# Patient Record
Sex: MALE | Race: WHITE | NOT HISPANIC OR LATINO | Employment: OTHER | URBAN - METROPOLITAN AREA
[De-identification: names, ages, dates, MRNs, and addresses within clinical notes are randomized per-mention and may not be internally consistent; named-entity substitution may affect disease eponyms.]

---

## 2017-01-26 DIAGNOSIS — C61 MALIGNANT NEOPLASM OF PROSTATE (HCC): ICD-10-CM

## 2017-02-02 ENCOUNTER — LAB CONVERSION - ENCOUNTER (OUTPATIENT)
Dept: OTHER | Facility: OTHER | Age: 72
End: 2017-02-02

## 2017-02-02 LAB — PROSTATE SPECIFIC ANTIGEN TOTAL (HISTORICAL): 1.3 NG/ML

## 2017-02-23 ENCOUNTER — GENERIC CONVERSION - ENCOUNTER (OUTPATIENT)
Dept: OTHER | Facility: OTHER | Age: 72
End: 2017-02-23

## 2017-02-23 ENCOUNTER — APPOINTMENT (OUTPATIENT)
Dept: RADIATION ONCOLOGY | Facility: CLINIC | Age: 72
End: 2017-02-23
Attending: RADIOLOGY
Payer: COMMERCIAL

## 2017-02-23 PROCEDURE — G0463 HOSPITAL OUTPT CLINIC VISIT: HCPCS | Performed by: RADIOLOGY

## 2017-02-23 PROCEDURE — 99213 OFFICE O/P EST LOW 20 MIN: CPT | Performed by: RADIOLOGY

## 2017-08-01 DIAGNOSIS — C61 MALIGNANT NEOPLASM OF PROSTATE (HCC): ICD-10-CM

## 2017-08-31 ENCOUNTER — LAB CONVERSION - ENCOUNTER (OUTPATIENT)
Dept: OTHER | Facility: OTHER | Age: 72
End: 2017-08-31

## 2017-08-31 LAB — PROSTATE SPECIFIC ANTIGEN TOTAL (HISTORICAL): 1.3 NG/ML

## 2017-09-28 ENCOUNTER — APPOINTMENT (OUTPATIENT)
Dept: RADIATION ONCOLOGY | Facility: CLINIC | Age: 72
End: 2017-09-28
Attending: RADIOLOGY
Payer: COMMERCIAL

## 2017-09-28 ENCOUNTER — GENERIC CONVERSION - ENCOUNTER (OUTPATIENT)
Dept: OTHER | Facility: OTHER | Age: 72
End: 2017-09-28

## 2017-09-28 PROCEDURE — 99214 OFFICE O/P EST MOD 30 MIN: CPT | Performed by: RADIOLOGY

## 2017-09-28 PROCEDURE — G0463 HOSPITAL OUTPT CLINIC VISIT: HCPCS | Performed by: RADIOLOGY

## 2018-01-12 VITALS
BODY MASS INDEX: 31.4 KG/M2 | DIASTOLIC BLOOD PRESSURE: 68 MMHG | RESPIRATION RATE: 16 BRPM | HEIGHT: 69 IN | OXYGEN SATURATION: 90 % | SYSTOLIC BLOOD PRESSURE: 114 MMHG | HEART RATE: 64 BPM | WEIGHT: 212 LBS

## 2018-01-13 VITALS
OXYGEN SATURATION: 96 % | BODY MASS INDEX: 32.56 KG/M2 | WEIGHT: 219.8 LBS | RESPIRATION RATE: 16 BRPM | HEIGHT: 69 IN | SYSTOLIC BLOOD PRESSURE: 116 MMHG | HEART RATE: 75 BPM | DIASTOLIC BLOOD PRESSURE: 80 MMHG

## 2018-07-05 ENCOUNTER — TELEPHONE (OUTPATIENT)
Dept: RADIATION ONCOLOGY | Facility: CLINIC | Age: 73
End: 2018-07-05

## 2018-09-01 DIAGNOSIS — C61 MALIGNANT NEOPLASM OF PROSTATE (HCC): ICD-10-CM

## 2018-09-08 LAB — PSA SERPL-MCNC: 1.8 NG/ML

## 2018-09-20 ENCOUNTER — RADIATION ONCOLOGY FOLLOW-UP (OUTPATIENT)
Dept: RADIATION ONCOLOGY | Facility: CLINIC | Age: 73
End: 2018-09-20
Attending: RADIOLOGY
Payer: COMMERCIAL

## 2018-09-20 ENCOUNTER — APPOINTMENT (OUTPATIENT)
Dept: RADIATION ONCOLOGY | Facility: CLINIC | Age: 73
End: 2018-09-20
Payer: COMMERCIAL

## 2018-09-20 VITALS
RESPIRATION RATE: 14 BRPM | HEART RATE: 62 BPM | HEIGHT: 69 IN | DIASTOLIC BLOOD PRESSURE: 70 MMHG | OXYGEN SATURATION: 97 % | WEIGHT: 219 LBS | BODY MASS INDEX: 32.44 KG/M2 | TEMPERATURE: 97.6 F | SYSTOLIC BLOOD PRESSURE: 116 MMHG

## 2018-09-20 DIAGNOSIS — C61 PROSTATE CANCER (HCC): Primary | ICD-10-CM

## 2018-09-20 PROCEDURE — 99214 OFFICE O/P EST MOD 30 MIN: CPT | Performed by: RADIOLOGY

## 2018-09-20 PROCEDURE — G0463 HOSPITAL OUTPT CLINIC VISIT: HCPCS | Performed by: RADIOLOGY

## 2018-09-20 RX ORDER — ASPIRIN 81 MG/1
1 TABLET, CHEWABLE ORAL DAILY
COMMUNITY

## 2018-09-20 RX ORDER — LISINOPRIL 20 MG/1
20 TABLET ORAL DAILY
COMMUNITY

## 2018-09-20 RX ORDER — GABAPENTIN 300 MG/1
300 CAPSULE ORAL 3 TIMES DAILY
COMMUNITY
Start: 2017-01-03 | End: 2018-09-20 | Stop reason: CLARIF

## 2018-09-20 RX ORDER — LANOLIN ALCOHOL/MO/W.PET/CERES
1 CREAM (GRAM) TOPICAL DAILY
COMMUNITY

## 2018-09-20 RX ORDER — DIAZEPAM 5 MG/1
1 TABLET ORAL DAILY
COMMUNITY
End: 2018-09-20 | Stop reason: ALTCHOICE

## 2018-09-20 RX ORDER — ROSUVASTATIN CALCIUM 20 MG/1
1 TABLET, COATED ORAL DAILY
COMMUNITY

## 2018-09-20 RX ORDER — CINNAMON BARK
1 POWDER (GRAM) MISCELLANEOUS DAILY
COMMUNITY
End: 2018-09-20 | Stop reason: ALTCHOICE

## 2018-09-20 NOTE — PROGRESS NOTES
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  He completed definitive radiation therapy in April 2009  Last seen 9/28/17      PSA Trends:  2/1/17 PSA 1 3  8/30/17 PSA 1 3  9/8/18 PSA 1 8          Prostate cancer ([facility] [address] )    11/25/2008 Initial Diagnosis     Prostate cancer ([facility] [address] )  - pretreatment PSA was 4 2 NG/ML           11/25/2008 Biopsy     Prostate biopsy: adenocarcinoma, primarily Ewing score 3+3=6 with one focus within the left lateral mid gland with Ewing score 3+4=7     - Dr Irene Varghese         11/25/2008 -  Cancer Staged     Stage II, T2 N0 M0, Berna 7         2/2/2009 - 4/1/2009 Radiation     TomoTherapy - site: prostate  Dose 7560 cGy at [address]            Clinical Trial: no        Interval History:History of a stage II, T2 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    Additional evaluation for depression, suicide risk assesment, referral to PCP or psychiatry, medication started:  no    Advanced Care Planning for Patients >65 years  Advanced Care Planning Discussed:  yes  Patient named surrogate decision maker or care plan in chart: yes      Health Maintenance   Topic Date Due    Depression Screening PHQ  1945    CRC Screening: Colonoscopy  1945    DTaP,Tdap,and Td Vaccines (1 - Tdap) 08/13/1966    Fall Risk  08/13/2010    Pneumococcal PPSV23/PCV13 65+ Years / High and Highest Risk (1 of 2 - PCV13) 08/13/2010    INFLUENZA VACCINE  09/01/2018       Patient Active Problem List   Diagnosis    Prostate cancer [facility])     Past Medical History:   Diagnosis Date    Prostate cancer ([facility] [address] ) 11/25/2008     Past Surgical History:   Procedure Laterality Date    COLONOSCOPY      PROSTATE BIOPSY  11/25/2008    TONSILLECTOMY AND ADENOIDECTOMY      age 25     Family History   Problem Relation Age of Onset    Rectal cancer Father     Colon cancer Paternal Grandfather      Social History     Social History    Marital status: /Civil Union     Spouse name: N/A    Number of children: N/A    Years of education: N/A     Occupational History    Not on file  Social History Main Topics    Smoking status: Not on file    Smokeless tobacco: Not on file    Alcohol use Not on file    Drug use: Unknown    Sexual activity: Not on file     Other Topics Concern    Not on file     Social History Narrative    No narrative on file       Current Outpatient Prescriptions:     aspirin 81 mg chewable tablet, Chew 1 tablet Daily, Disp: , Rfl:     Cholecalciferol 2000 units CAPS, Take 1 capsule by mouth daily, Disp: , Rfl:     DAILY MULTIPLE VITAMINS PO, Take 1 tablet by mouth daily, Disp: , Rfl:     glucosamine-chondroitin 500-400 MG tablet, Take 1 tablet by mouth daily, Disp: , Rfl:     rosuvastatin (CRESTOR) 20 MG tablet, Take 1 tablet by mouth daily, Disp: , Rfl:   Allergies   Allergen Reactions    Erythromycin Base Nausea Only    Penicillins Hives       Review of Systems:  Review of Systems   Constitutional: Positive for fatigue (slight)  HENT: Negative  Eyes: Negative  Respiratory: Negative  Cardiovascular: Negative  Gastrointestinal: Negative  Endocrine: Negative  Genitourinary:        Nocturia occasionally   Musculoskeletal: Positive for arthralgias  Skin: Negative  Allergic/Immunologic: Negative  Neurological: Negative  Hematological: Negative  Psychiatric/Behavioral: Negative          Vitals:    09/20/18 1428   BP: 116/70   BP Location: Left arm   Pulse: 62   Resp: 14   Temp: 97 6 °F (36 4 °C)   SpO2: 97%   Weight: 99 3 kg (219 lb)   Height: 5' 9" (1 753 m)            Imaging:No results

## 2018-09-20 NOTE — PROGRESS NOTES
Follow-up - Radiation Oncology   Khadra Parkinson 1945 68 y o  male [id number]      History of Present Illness   Cancer Staging  Stage II, T2 N0 M0 Berna score 7 adenocarcinoma the prostate    Khadra Parkinson is a 68y o  year old male who returns for follow up post prostate radiation at [facility] completed on 4/1/2009  Last seen for follow up on 9/28/2017  Interval History:  PSA Trends:  2/1/17 PSA 1 3  8/30/17 PSA 1 3  9/8/18 PSA 1 8    He reports he is feeling well  He is having no gastrointestinal nor genitourinary complaints  He has stable nocturia once each night  He recently returned from a trip to [place] and [place] with his wife  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    Additional evaluation for depression, suicide risk assesment, referral to PCP or psychiatry, medication started:  no     Advanced Care Planning for Patients >65 years  Advanced Care Planning Discussed:  yes  Patient named surrogate decision maker or care plan in chart: yes    Historical Information      Prostate cancer ([place] [address] )    11/25/2008 Initial Diagnosis     Prostate cancer ([facility] )  - pretreatment PSA was 4 2 NG/ML           11/25/2008 Biopsy     Prostate biopsy: adenocarcinoma, primarily Kendall score 3+3=6 with one focus within the left lateral mid gland with Kendall score 3+4=7     - Dr Magali Ratliff         11/25/2008 -  Cancer Staged     Stage II, T2 N0 M0, Berna 7         2/2/2009 - 4/1/2009 Radiation TomoTherapy - site: prostate  Dose 7560 cGy at [address]            Past Medical History:   Diagnosis Date    Prostate cancer [facility]) 11/25/2008     Past Surgical History:   Procedure Laterality Date    COLONOSCOPY      PROSTATE BIOPSY  11/25/2008    TONSILLECTOMY AND ADENOIDECTOMY      age 25       Social History   History   Alcohol Use    Yes     Comment: socially     History   Drug Use No     History   Smoking Status    Former Smoker    Quit date: 1990   Smokeless Tobacco    Never Used     Meds/Allergies     Current Outpatient Prescriptions:     aspirin 81 mg chewable tablet, Chew 1 tablet Daily, Disp: , Rfl:     Cholecalciferol 2000 units CAPS, Take 1 capsule by mouth daily, Disp: , Rfl:     DAILY MULTIPLE VITAMINS PO, Take 1 tablet by mouth daily, Disp: , Rfl:     glucosamine-chondroitin 500-400 MG tablet, Take 1 tablet by mouth daily, Disp: , Rfl:     lisinopril (ZESTRIL) 20 mg tablet, Take 20 mg by mouth daily Takes 1/2 tab, Disp: , Rfl:     rosuvastatin (CRESTOR) 20 MG tablet, Take 1 tablet by mouth daily, Disp: , Rfl:   Allergies   Allergen Reactions    Erythromycin Base Nausea Only    Penicillins Hives     Review of Systems   Constitutional: Positive for fatigue (slight)  HENT: Negative  Eyes: Negative  Respiratory: Negative  Cardiovascular: Negative  Gastrointestinal: Negative  Endocrine: Negative  Genitourinary:        Nocturia occasionally   Musculoskeletal: Positive for arthralgias  Skin: Negative  Allergic/Immunologic: Negative  Neurological: Negative  Hematological: Negative  Psychiatric/Behavioral: Negative         OBJECTIVE:   /70 (BP Location: Left arm)   Pulse 62   Temp 97 6 °F (36 4 °C)   Resp 14   Ht 5' 9" (1 753 m)   Wt 99 3 kg (219 lb)   SpO2 97%   BMI 32 34 kg/m²   Pain Assessment:  0  ECOG/Zubrod/WHO: 1 - Symptomatic but completely ambulatory    Physical Exam   Constitutional: He is oriented to person, place, and time  He appears well-developed and well-nourished  No distress  HENT:   Head: Normocephalic and atraumatic  Mouth/Throat: No oropharyngeal exudate  Eyes: Conjunctivae and EOM are normal  Pupils are equal, round, and reactive to light  No scleral icterus  Neck: Normal range of motion  Neck supple  No tracheal deviation present  No thyromegaly present  Cardiovascular: Normal rate, regular rhythm and normal heart sounds  Pulmonary/Chest: Effort normal and breath sounds normal  No respiratory distress  He has no wheezes  He has no rales  He exhibits no tenderness  Abdominal: Soft  Bowel sounds are normal  He exhibits no distension and no mass  There is no tenderness  Genitourinary: Rectum normal and prostate normal    Genitourinary Comments: Prostate is smooth flat and without any induration or nodularity  Musculoskeletal: Normal range of motion  He exhibits no edema or tenderness  Lymphadenopathy:     He has no cervical adenopathy  Right: No inguinal and no supraclavicular adenopathy present  Left: No inguinal and no supraclavicular adenopathy present  Neurological: He is alert and oriented to person, place, and time  No cranial nerve deficit  Coordination normal    Skin: Skin is warm and dry  No rash noted  He is not diaphoretic  No erythema  No pallor  Psychiatric: He has a normal mood and affect  His behavior is normal  Judgment and thought content normal    Nursing note and vitals reviewed  RESULTS    Lab Results:   Recent Results (from the past 672 hour(s))   PSA, Total Screen    Collection Time: 09/07/18  9:19 AM   Result Value Ref Range    Prostate Specific Antigen Total 1 8 < OR = 4 0 ng/mL       Imaging Studies:No results found        Assessment/Plan:  Orders Placed This Encounter   Procedures    PSA Total, Diagnostic        Gerson Lazo is a 68y o  year old male who is now 9 5 years post the completion of definitive radiation therapy for Berna score 6 stage II prostate adenocarcinoma  His pretreatment PSA was 4 2 NG/ML  His PSA level in May 2014 was stable at 0 8 ng/ML  This was repeated in November 2014 and was 0 7 NG/ML  Repeat PSA level November 24, 2015 was 0 9 NG/ML  His PSA level performed February 1, 2017 was 1 3 NG/ML  His PSA level August 30, 2017 was stable at 1 3 NG/mL  His most recent PSA level from September 9, 2018 was 1 8 NG/mL  We discussed with the patient and his wife that 9 5 years after completion of treatment there can sometimes be a slight rise in the PSA level that will then reach a plateau  Examination clinically today reveals no evidence of recurrence  We recommended continued follow-up and he will return in 12 months with a repeat PSA level  Shantanu Kearns MD  9/20/2018,3:10 PM    Portions of the record may have been created with voice recognition software   Occasional wrong word or "sound a like" substitutions may have occurred due to the inherent limitations of voice recognition software   Read the chart carefully and recognize, using context, where substitutions have occurred

## 2018-09-20 NOTE — PROGRESS NOTES
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 prostate  He completed definitive radiation therapy in April 2009  Last seen 9/28/17      PSA Trends:  2/1/17 PSA 1 3  8/30/17 PSA 1 3  9/8/18 PSA 1 8          Pro[facility] ([facility] [address] )    11/25/2008 Initial Diagnosis     [facility] )  - pretreatment PSA was 4 2 NG/ML           11/25/2008 Biopsy     Prostate biopsy: adenocarcinoma, primarily Berna score 3+3=6 with one focus within the left lateral mid gland with Lowpoint score 3+4=7     - Dr Vance Azevedo         11/25/2008 -  Cancer Staged     Stage II, T2 N0 M0, Berna 7         2/2/2009 - 4/1/2009 Radiation     TomoTherapy - site: prostate  Dose 7560 cGy at [address]            Interval History:***    GYN History:***    Patient Active Problem List   Diagnosis    Prostate cancer [facility])     Past Medical History:   Diagnosis Date    Prostate cancer ([facility] [address] ) 11/25/2008     Past Surgical History:   Procedure Laterality Date    COLONOSCOPY      PROSTATE BIOPSY  11/25/2008    TONSILLECTOMY AND ADENOIDECTOMY      age 25     Family History   Problem Relation Age of Onset    Rectal cancer Father     Colon cancer Paternal Grandfather      Social History     Social History    Marital status: /Civil Union     Spouse name: N/A    Number of children: N/A    Years of education: N/A     Occupational History    Not on file       Social History Main Topics    Smoking status: Not on file    Smokeless tobacco: Not on file    Alcohol use Not on file    Drug use: Unknown    Sexual activity: Not on file     Other Topics Concern    Not on file     Social History Narrative    No narrative on file       Current Outpatient Prescriptions:     aspirin 81 mg chewable tablet, Chew 1 tablet Daily, Disp: , Rfl:    Cholecalciferol 2000 units CAPS, Take 1 capsule by mouth daily, Disp: , Rfl:     DAILY MULTIPLE VITAMINS PO, Take 1 tablet by mouth daily, Disp: , Rfl:     glucosamine-chondroitin 500-400 MG tablet, Take 1 tablet by mouth daily, Disp: , Rfl:     rosuvastatin (CRESTOR) 20 MG tablet, Take 1 tablet by mouth daily, Disp: , Rfl:   Allergies   Allergen Reactions    Erythromycin Base Nausea Only    Penicillins Hives       Review of Systems:  Review of Systems    Vitals:    09/20/18 1428   BP: 116/70   BP Location: Left arm   Pulse: 62   Resp: 14   Temp: 97 6 °F (36 4 °C)   SpO2: 97%   Weight: 99 3 kg (219 lb)   Height: 5' 9" (1 753 m)       Imaging:No results found      Teaching:***

## 2019-01-11 ENCOUNTER — OFFICE VISIT (OUTPATIENT)
Dept: URGENT CARE | Facility: CLINIC | Age: 74
End: 2019-01-11
Payer: COMMERCIAL

## 2019-01-11 ENCOUNTER — APPOINTMENT (OUTPATIENT)
Dept: RADIOLOGY | Facility: CLINIC | Age: 74
End: 2019-01-11
Payer: COMMERCIAL

## 2019-01-11 VITALS
RESPIRATION RATE: 20 BRPM | OXYGEN SATURATION: 97 % | DIASTOLIC BLOOD PRESSURE: 89 MMHG | WEIGHT: 226 LBS | HEART RATE: 72 BPM | SYSTOLIC BLOOD PRESSURE: 141 MMHG | HEIGHT: 69 IN | TEMPERATURE: 96.9 F | BODY MASS INDEX: 33.47 KG/M2

## 2019-01-11 DIAGNOSIS — M25.561 ACUTE PAIN OF RIGHT KNEE: Primary | ICD-10-CM

## 2019-01-11 DIAGNOSIS — M25.561 ACUTE PAIN OF RIGHT KNEE: ICD-10-CM

## 2019-01-11 PROCEDURE — 73564 X-RAY EXAM KNEE 4 OR MORE: CPT

## 2019-01-11 PROCEDURE — 99213 OFFICE O/P EST LOW 20 MIN: CPT | Performed by: EMERGENCY MEDICINE

## 2019-01-11 RX ORDER — METHYLPREDNISOLONE 4 MG/1
TABLET ORAL
Qty: 21 TABLET | Refills: 0 | Status: SHIPPED | OUTPATIENT
Start: 2019-01-11

## 2019-01-11 NOTE — PROGRESS NOTES
[facility] [facility]        NAME: Jerene Hatchet is a 68 y o  male  : 1945    MRN: [id number]  DATE: 2019  TIME: 11:01 AM    Assessment and Plan   Acute pain of right knee [M25 561]  1  Acute pain of right knee  XR knee 4+ vw right injury    Methylprednisolone 4 MG TBPK    Ambulatory referral to Physical Therapy    CANCELED: Ambulatory referral to Orthopedic Surgery     I discussed with patient pros and cons of ortho referral or trial of physical therapy  He chooses trial of physical therapy  Wife was present during this conversation  Patient Instructions     Patient Instructions       Meniscus Tear   AMBULATORY CARE:   A meniscus tear  is a tear in the cartilage of your knee  The meniscus is a piece of cartilage (strong tissue) between your thighbone and shinbone  The meniscus helps to cushion your knee joint and keep it stable  Common symptoms include the following:   · A pop or tear when the injury happens    · Pain and swelling    · Tenderness    · Stiffness    · Popping, catching, or locking of your knee    · Not being able to extend your knee fully  Call 911 for any of the following:   · Your arm or leg feels warm, tender, and painful  It may look swollen and red  Seek care immediately if:   · You cannot move your knee at all  Contact your healthcare provider if:   · Your symptoms do not improve with treatment  · You have questions or concerns about your condition or care  Treatment for a meniscus tear  depends on the type of tear you have  Some types of meniscus tears can heal on their own  You may need any of the following:  · NSAIDs , such as ibuprofen, help decrease swelling, pain, and fever  This medicine is available with or without a doctor's order  NSAIDs can cause stomach bleeding or kidney problems in certain people  If you take blood thinner medicine, always ask if NSAIDs are safe for you  Always read the medicine label and follow directions   Do not give these medicines to children under 10months of age without direction from your child's healthcare provider  · Rest  your knee  Avoid activities that make the swelling or pain worse  You may need to avoid putting weight on your leg while you have pain  Your healthcare provider may recommend that you use crutches  · Apply ice  on your knee for 15 to 20 minutes every hour or as directed  Use an ice pack, or put crushed ice in a plastic bag  Cover it with a towel  Ice helps prevent tissue damage and decreases swelling and pain  · Compress  your knee with an elastic bandage, air cast, medical boot, or splint to reduce swelling  Ask your healthcare provider which compression device to use, and how tight it should be  · Elevate  your knee above the level of your heart as often as you can  This will help decrease swelling and pain  Prop your knee on pillows or blankets to keep it elevated comfortably  · Surgery  may be needed if your symptoms do not improve  Your healthcare provider may trim away or repair damaged tissue  Follow up with your healthcare provider as directed:  Write down your questions so you remember to ask them during your visits  © 2017 [facility] Information is for End User's use only and may not be sold, redistributed or otherwise used for commercial purposes  All illustrations and images included in CareNotes® are the copyrighted property of A D A M , Inc  or Philip Hurley  The above information is an  only  It is not intended as medical advice for individual conditions or treatments  Talk to your doctor, nurse or pharmacist before following any medical regimen to see if it is safe and effective for you  Follow up with PCP in 3-5 days  Proceed to  ER if symptoms worsen  Chief Complaint     Chief Complaint   Patient presents with    Knee Pain     Right knee pain, onset 1 day ago   "I was going down the stairs and felt a twinge in my knee" History of Present Illness       Patient complains of sudden onset of right knee pain when going downstairs yesterday  He has long history of bilateral knee pain diagnosed as osteoarthritis  He has difficulty bearing weight on his right knee today and there is some mild swelling  He denies right knee giving out or locking on him since the event  He claims some relief with ibuprofen  Review of Systems   Review of Systems   Constitutional: Negative for chills and fever  Musculoskeletal: Positive for gait problem  Negative for arthralgias and joint swelling  Skin: Negative for color change, rash and wound  Neurological: Negative for numbness           Current Medications       Current Outpatient Prescriptions:     Cholecalciferol 2000 units CAPS, Take 1 capsule by mouth daily, Disp: , Rfl:     DAILY MULTIPLE VITAMINS PO, Take 1 tablet by mouth daily, Disp: , Rfl:     glucosamine-chondroitin 500-400 MG tablet, Take 1 tablet by mouth daily, Disp: , Rfl:     lisinopril (ZESTRIL) 20 mg tablet, Take 20 mg by mouth daily Takes 1/2 tab, Disp: , Rfl:     rosuvastatin (CRESTOR) 20 MG tablet, Take 1 tablet by mouth daily, Disp: , Rfl:     aspirin 81 mg chewable tablet, Chew 1 tablet Daily, Disp: , Rfl:     Methylprednisolone 4 MG TBPK, Use as directed on package, Disp: 21 tablet, Rfl: 0    Current Allergies     Allergies as of 01/11/2019 - Reviewed 01/11/2019   Allergen Reaction Noted    Erythromycin base Nausea Only     Penicillins Hives 12/01/2016            The following portions of the patient's history were reviewed and updated as appropriate: allergies, current medications, past family history, past medical history, past social history, past surgical history and problem list      Past Medical History:   Diagnosis Date    Prostate cancer ([facility] [address] ) 11/25/2008       Past Surgical History:   Procedure Laterality Date    COLONOSCOPY      PROSTATE BIOPSY  11/25/2008    TONSILLECTOMY AND ADENOIDECTOMY      age 25       Family History   Problem Relation Age of Onset    Rectal cancer Father     Colon cancer Paternal Grandfather          Medications have been verified  Objective   /89   Pulse 72   Temp (!) 96 9 °F (36 1 °C) (Tympanic)   Resp 20   Ht 5' 9" (1 753 m)   Wt 103 kg (226 lb)   SpO2 97%   BMI 33 37 kg/m²        Physical Exam     Physical Exam   Constitutional: He is oriented to person, place, and time  He appears well-developed and well-nourished  No distress  Neck: Neck supple  Cardiovascular: Normal rate and regular rhythm  Pulmonary/Chest: Effort normal and breath sounds normal    Musculoskeletal: He exhibits tenderness  He exhibits no deformity  Mild effusion right knee, mild tenderness at medial collateral ligament right knee, no joint line tenderness, negative Eusebio's negative Lachman's, knee pain worse at full extension   Neurological: He is alert and oriented to person, place, and time  He has normal reflexes  Skin: Skin is warm and dry  No rash noted  No erythema  Psychiatric: He has a normal mood and affect  His behavior is normal  Judgment and thought content normal    Nursing note and vitals reviewed

## 2019-01-11 NOTE — PATIENT INSTRUCTIONS
Meniscus Tear   AMBULATORY CARE:   A meniscus tear  is a tear in the cartilage of your knee  The meniscus is a piece of cartilage (strong tissue) between your thighbone and shinbone  The meniscus helps to cushion your knee joint and keep it stable  Common symptoms include the following:   · A pop or tear when the injury happens    · Pain and swelling    · Tenderness    · Stiffness    · Popping, catching, or locking of your knee    · Not being able to extend your knee fully  Call 911 for any of the following:   · Your arm or leg feels warm, tender, and painful  It may look swollen and red  Seek care immediately if:   · You cannot move your knee at all  Contact your healthcare provider if:   · Your symptoms do not improve with treatment  · You have questions or concerns about your condition or care  Treatment for a meniscus tear  depends on the type of tear you have  Some types of meniscus tears can heal on their own  You may need any of the following:  · NSAIDs , such as ibuprofen, help decrease swelling, pain, and fever  This medicine is available with or without a doctor's order  NSAIDs can cause stomach bleeding or kidney problems in certain people  If you take blood thinner medicine, always ask if NSAIDs are safe for you  Always read the medicine label and follow directions  Do not give these medicines to children under 10months of age without direction from your child's healthcare provider  · Rest  your knee  Avoid activities that make the swelling or pain worse  You may need to avoid putting weight on your leg while you have pain  Your healthcare provider may recommend that you use crutches  · Apply ice  on your knee for 15 to 20 minutes every hour or as directed  Use an ice pack, or put crushed ice in a plastic bag  Cover it with a towel  Ice helps prevent tissue damage and decreases swelling and pain      · Compress  your knee with an elastic bandage, air cast, medical boot, or splint to reduce swelling  Ask your healthcare provider which compression device to use, and how tight it should be  · Elevate  your knee above the level of your heart as often as you can  This will help decrease swelling and pain  Prop your knee on pillows or blankets to keep it elevated comfortably  · Surgery  may be needed if your symptoms do not improve  Your healthcare provider may trim away or repair damaged tissue  Follow up with your healthcare provider as directed:  Write down your questions so you remember to ask them during your visits  © 2017 [address] [facility] Information is for End User's use only and may not be sold, redistributed or otherwise used for commercial purposes  All illustrations and images included in CareNotes® are the copyrighted property of A D A M [employer] Inc  or Philip Hurley  The above information is an  only  It is not intended as medical advice for individual conditions or treatments  Talk to your doctor, nurse or pharmacist before following any medical regimen to see if it is safe and effective for you

## 2019-01-15 ENCOUNTER — EVALUATION (OUTPATIENT)
Dept: PHYSICAL THERAPY | Facility: CLINIC | Age: 74
End: 2019-01-15
Payer: COMMERCIAL

## 2019-01-15 DIAGNOSIS — M25.561 ACUTE PAIN OF RIGHT KNEE: Primary | ICD-10-CM

## 2019-01-15 PROCEDURE — 97110 THERAPEUTIC EXERCISES: CPT | Performed by: PHYSICAL THERAPIST

## 2019-01-15 PROCEDURE — 97162 PT EVAL MOD COMPLEX 30 MIN: CPT | Performed by: PHYSICAL THERAPIST

## 2019-01-15 NOTE — PROGRESS NOTES
PT Evaluation     Today's date: 1/15/2019  Patient name: Gerson Lazo  : 1945  MRN: [id number]  Referring provider: Félix Aranda MD  Dx:   Encounter Diagnosis     ICD-10-CM    1  Acute pain of right knee M25 561 Ambulatory referral to Physical Therapy                  Assessment  Assessment details: Gerson Lazo is a pleasant 68 y o  male presenting to PT with cc of R knee pain beginning last Thursday  Pt  States he was walking down stairs and experienced sharp shooting pain in anterior R knee  Upon examination, patient was found to have objective deficits as listed below and is displaying ss consistent with knee hypomobility, mm tightness, and poor patellar tracking due to genu varus  Pt  Is experiencing subsequent functional deficits including difficulty with stair navigation, difficulty with squatting, and difficulty walking over 30 mins  Pt  Was educated on role of PT to address issues and improve LE flexibility  Pt  Would benefit from skilled physical therapy to promote improved function and maximize activity tolerance     Understanding of Dx/Px/POC: good   Prognosis: good    Goals    Short Term Goals:  - Decrease pain by 50% in 3 weeks  - Increase ROM by 50% in 4 weeks  - Increase strength by 50% in 4 weeks    Long Term Goals:  - Independent with comprehensive home exercise program at discharge  - Increase Functional Status Measure to: 80  - Increase strength equal to contralateral side at discharge  - Increase ROM to [facility] at discharge    Plan  Patient would benefit from: skilled physical therapy  Planned modality interventions: cryotherapy, unattended electrical stimulation, thermotherapy: hydrocollator packs and high voltage pulsed current: pain management  Planned therapy interventions: abdominal trunk stabilization, balance/weight bearing training, body mechanics training, home exercise program, therapeutic exercise, therapeutic activities, stretching, strengthening, neuromuscular re-education, Earnest taping and joint mobilization  Frequency: 2x week  Duration in weeks: 6  Treatment plan discussed with: patient        Subjective Evaluation    History of Present Illness  Date of onset: 1/10/2019  Mechanism of injury: Pt  Presents to PT noting cc of acute R knee pain beginning this past Thursday  He states walking down stairs and feeling rapid "sharp" pain in anterior knee  He notes immediate onset of weakness in knee due to pain, but denies any popping or giving out  He visited Dr Ritesh Glass the next morning and received x-rays showing mild medial knee OA  Pt  Notes having B bowleggedness for past 20+ years which always seemed to give him some aching  He worked as  and is semi retired at this time  He notes pain is typically along medial joint line but this pain has been anterior extending from distal quads to patellar tendon insertion  He notes swelling and pain have reduced well with steroids  Pain  Current pain ratin  At best pain ratin  At worst pain ratin  Location: anterior knee  Quality: dull ache, pressure and grinding  Relieving factors: ice, relaxation and rest  Aggravating factors: walking, standing, stair climbing, running and lifting  Progression: improved      Diagnostic Tests  X-ray: abnormal  Patient Goals  Patient goals for therapy: decreased edema, decreased pain, improved balance, increased motion, return to sport/leisure activities, independence with ADLs/IADLs and increased strength  Patient goal: be able to help son in garage        Objective     Static Posture     Knee   Genu varus  Observations     Right Knee   Positive for edema and effusion  Palpation     Right Tenderness of the vastus medialis  Trigger point to medial gastrocnemius  Tenderness     Right Knee   Tenderness in the medial joint line, patellar tendon and quadriceps tendon       Neurological Testing     Sensation     Knee   Left Knee   Intact: light touch    Right Knee   Intact: light touch     Reflexes   Left   Patellar (L4): normal (2+)    Right   Patellar (L4): normal (2+)    Active Range of Motion   Left Knee   Normal active range of motion    Right Knee   Flexion: 121 degrees   Extension: -5 degrees     Mobility   Patellar Mobility:     Right Knee   Hypomobile: lateral, superior and inferior     Patellar Static Positioning   Right Knee: medial tilt and jayna    Strength/Myotome Testing     Left Knee   Normal strength    Right Knee   Flexion: 4+  Extension: 4    Tests     Right Knee   Positive medial Eusebio and patella-femoral grind  Negative anterior drawer, valgus stress test at 30 degrees and varus stress test at 30 degrees             Precautions: None    Daily Treatment Diary     Manual  1/15            Patellar Mobz -            STM -                                                       Exercise Diary  1/15            Bike -            Heel Slides with QS 5"/5" 3 min            SLR 20x            SLR abduction -            Quad Stretch 4x30"            Hamstring Stretch 4x30"            Runner's Stretch 4x30"            Ball Squats -            IT band stretch -                                                                                                                                                               Modalities  1/15            HV with CP -

## 2019-01-17 ENCOUNTER — OFFICE VISIT (OUTPATIENT)
Dept: PHYSICAL THERAPY | Facility: CLINIC | Age: 74
End: 2019-01-17
Payer: COMMERCIAL

## 2019-01-17 DIAGNOSIS — M25.561 ACUTE PAIN OF RIGHT KNEE: Primary | ICD-10-CM

## 2019-01-17 PROCEDURE — 97140 MANUAL THERAPY 1/> REGIONS: CPT

## 2019-01-17 PROCEDURE — 97110 THERAPEUTIC EXERCISES: CPT

## 2019-01-17 PROCEDURE — 97112 NEUROMUSCULAR REEDUCATION: CPT

## 2019-01-17 NOTE — PROGRESS NOTES
Daily Note     Today's date: 2019  Patient name: Shannan Frye  : 1945  MRN: [id number]  Referring provider: Lake Serna MD  Dx:   Encounter Diagnosis     ICD-10-CM    1  Acute pain of right knee M25 561        Start Time: 0900  Stop Time: 09  Total time in clinic (min): 52 minutes    Subjective: Pt states that he is feeling "ok" today  Reports experiencing no pain this morning prior to start of session  Yesterday while showering,  Patient said he lifted his leg to wash his foot and felt a sharp pain in his knee, which eventually went away with a few minutes rest         Objective: See treatment diary below      Assessment: Tolerated treatment well  Was able to perform all exercise with no increase of pain, requiring minimal verbal/tactile cues for proper form with exercises  Had slight difficulty with ball squats 2* genu varus of both knees, but was able self correct once cues for proper knee tracking received  Patient would benefit from continued PT  Plan: Continue per plan of care  Progress treatment as tolerated        Precautions: None    Daily Treatment Diary     Manual  1/15 1/17           Patellar Mobz - PROM  8'           STM - 5'                                                      Exercise Diary  1/15 1/17           Bike - L1 10 min           Heel Slides with QS 5"/5" 3 min 5"/5"  3min           SLR 20x 20x           SLR abduction - 20x           Quad Stretch 4x30" 4x30"  HEP           Hamstring Stretch 4x30" 4x30"  HEP           Runner's Stretch 4x30" 4x30"  HEP           Ball Squats - 10x           IT band stretch - 4x30"  HEP                                                                                                                                                              Modalities  1/15 1/17           HV with CP - -

## 2019-01-22 ENCOUNTER — OFFICE VISIT (OUTPATIENT)
Dept: PHYSICAL THERAPY | Facility: CLINIC | Age: 74
End: 2019-01-22
Payer: COMMERCIAL

## 2019-01-22 DIAGNOSIS — M25.561 ACUTE PAIN OF RIGHT KNEE: Primary | ICD-10-CM

## 2019-01-22 PROCEDURE — 97110 THERAPEUTIC EXERCISES: CPT | Performed by: PHYSICAL THERAPIST

## 2019-01-22 PROCEDURE — 97140 MANUAL THERAPY 1/> REGIONS: CPT | Performed by: PHYSICAL THERAPIST

## 2019-01-22 NOTE — PROGRESS NOTES
Daily Note     Today's date: 2019  Patient name: Steven Nuñez  : 1945  MRN: [id number]  Referring provider: Moshe Christopher MD  Dx:   Encounter Diagnosis     ICD-10-CM    1  Acute pain of right knee M25 561                   Subjective: Pt  Notes anterior knees are a little achey but generally pain has been improved  Objective: See treatment diary below      Assessment: Steven Nuñez was progressed with PT interventions, focusing on appropriate technique and intensity  Pt  Required mod cuing from therapist to complete  Pt  Patellar restrictions appear to be large contributor to pain  Pt  Would benefit from continued physical therapy to promote improved activity tolerance and function  Plan: Continue per plan of care  Progress treatment as tolerated        Precautions: None    Daily Treatment Diary     Manual  1/15 1/17 1/22          Patellar Mobz - PROM  8' 8'          STM - 5' 2'                                                     Exercise Diary  1/15 1/17 1/22          Bike - L1 10 min L2 10 min          Heel Slides with QS 5"/5" 3 min 5"/5"  3min 4"/4" 4 min          SLR 20x 20x 30x          SLR abduction - 20x 20x          Quad Stretch 4x30" 4x30"  HEP 4x30"          Hamstring Stretch with strap 4x30" 4x30"  HEP 4x30"          Runner's Stretch 4x30" 4x30"  HEP 4x30"          Ball Squats - 10x 20x          IT band stretch - 4x30"  HEP 4x30"          Sit to Stands - - 20x          Heel Rise with TKE - - 30x          SAQ - - 30x          IT band stretch with strap - - 4x30"                                                                                                         Modalities  1/15 1/17 1/22          HV with CP - -

## 2019-01-24 ENCOUNTER — OFFICE VISIT (OUTPATIENT)
Dept: PHYSICAL THERAPY | Facility: CLINIC | Age: 74
End: 2019-01-24
Payer: COMMERCIAL

## 2019-01-24 DIAGNOSIS — M25.561 ACUTE PAIN OF RIGHT KNEE: Primary | ICD-10-CM

## 2019-01-24 PROCEDURE — 97112 NEUROMUSCULAR REEDUCATION: CPT

## 2019-01-24 PROCEDURE — 97110 THERAPEUTIC EXERCISES: CPT

## 2019-01-24 NOTE — PROGRESS NOTES
Daily Note     Today's date: 2019  Patient name: Shannan Frye  : 1945  MRN: [id number]  Referring provider: Lake Serna MD  Dx:   Encounter Diagnosis     ICD-10-CM    1  Acute pain of right knee M25 561        Start Time: 830  Stop Time: 920  Total time in clinic (min): 50 minutes    Subjective: Patient reports no new complaints this session  States R knee has been feeling better with minimal to no pain and tends to feel slight soreness the day after his treatments  Objective: See treatment diary below      Assessment: Tolerated treatment well  Was able to perform all exercise with no increase of pain  Reps increased as tolerated by patient and welcomes being challenged  Continues to require minimal verbal cueing for proper form, especially during ball squats 2* to genu varus of BLE  Patient would benefit from continued PT to further strengthen R knee and return to PLOF  Plan: Continue per plan of care  Progress treatment as tolerated        Precautions: None    Daily Treatment Diary     Manual  1/15 1/17 1/22 1/24         Patellar Mobz - PROM  8' 8' np         STM - 5' 2' np                                                    Exercise Diary  1/15 1/17 1/22 1/24         Bike - L1 10 min L2 10 min L2 10 min         Heel Slides with QS 5"/5" 3 min 5"/5"  3min 4"/4" 4 min 5"/5"  5 min         SLR 20x 20x 30x 30X         SLR abduction - 20x 20x 30x         Quad Stretch 4x30" 4x30"  HEP 4x30" 4x30"         Hamstring Stretch with strap 4x30" 4x30"  HEP 4x30" 4x30"         Runner's Stretch 4x30" 4x30"  HEP 4x30" 4x30"         Ball Squats - 10x 20x 20x         IT band stretch - 4x30"  HEP 4x30" 4x30"         Sit to Stands - - 20x 20x         Heel Rise with TKE - - 30x 30x         SAQ - - 30x 30X         IT band stretch with strap - - 4x30" 4x30"                                                                                                        Modalities  1/15 1/17 1/22 1/23 HV with CP - -

## 2019-01-29 ENCOUNTER — OFFICE VISIT (OUTPATIENT)
Dept: PHYSICAL THERAPY | Facility: CLINIC | Age: 74
End: 2019-01-29
Payer: COMMERCIAL

## 2019-01-29 DIAGNOSIS — M25.561 ACUTE PAIN OF RIGHT KNEE: Primary | ICD-10-CM

## 2019-01-29 PROCEDURE — 97110 THERAPEUTIC EXERCISES: CPT | Performed by: PHYSICAL THERAPIST

## 2019-01-29 PROCEDURE — 97112 NEUROMUSCULAR REEDUCATION: CPT | Performed by: PHYSICAL THERAPIST

## 2019-01-29 NOTE — PROGRESS NOTES
Daily Note     Today's date: 2019  Patient name: Fred Taylor  : 1945  MRN: [id number]  Referring provider: Lizabeth Herrera MD  Dx:   Encounter Diagnosis     ICD-10-CM    1  Acute pain of right knee M25 561                   Subjective: Pt  Notes knee soreness remains but is significantly improved  Objective: See treatment diary below      Assessment: Tolerated treatment well  Patient demonstrated fatigue post treatment, exhibited good technique with therapeutic exercises and would benefit from continued PT      Plan: Continue per plan of care  Progress treatment as tolerated          Precautions: None    Daily Treatment Diary     Manual  1/15 1/17 1/22 1/24 1/29        Patellar Mobz - PROM  8' 8' np 5 min        STM - 5' 2' np                                                    Exercise Diary  1/15 1/17 1/22 1/24 1/29        Bike - L1 10 min L2 10 min L2 10 min L2 10 min        Heel Slides with QS 5"/5" 3 min 5"/5"  3min 4"/4" 4 min 5"/5"  5 min 5"/5" 5 min        SLR 20x 20x 30x 30X 1 5# 20x        SLR abduction - 20x 20x 30x 1 5# 20x        Quad Stretch 4x30" 4x30"  HEP 4x30" 4x30" 4x30"        Hamstring Stretch with strap 4x30" 4x30"  HEP 4x30" 4x30" 4x30"        Runner's Stretch 4x30" 4x30"  HEP 4x30" 4x30" 4x30"        Ball Squats - 10x 20x 20x 20x        IT band stretch - 4x30"  HEP 4x30" 4x30" 4x30"        Sit to Stands - - 20x 20x 20x        Heel Rise with TKE - - 30x 30x 30x        SAQ - - 30x 30X 1 5# 30x        IT band stretch with strap - - 4x30" 4x30"         SLR Extensions - - - - 1 5# 20x        Ball Squeezes in HL - - - - 5"/5" 3 min                                                                             Modalities  1/15 1/17 1/22 1/23         HV with CP - -

## 2019-01-31 ENCOUNTER — OFFICE VISIT (OUTPATIENT)
Dept: PHYSICAL THERAPY | Facility: CLINIC | Age: 74
End: 2019-01-31
Payer: COMMERCIAL

## 2019-01-31 DIAGNOSIS — M25.561 ACUTE PAIN OF RIGHT KNEE: Primary | ICD-10-CM

## 2019-01-31 PROCEDURE — 97112 NEUROMUSCULAR REEDUCATION: CPT

## 2019-01-31 PROCEDURE — 97110 THERAPEUTIC EXERCISES: CPT

## 2019-01-31 NOTE — PROGRESS NOTES
Daily Note     Today's date: 2019  Patient name: Inocencio Fitzgerald  : 1945  MRN: [id number]  Referring provider: Martha Taylor MD  Dx:   Encounter Diagnosis     ICD-10-CM    1  Acute pain of right knee M25 561        Start Time: 0900  Stop Time: 0955  Total time in clinic (min): 55 minutes    Subjective: Pt reports having no pain prior to start of session today  States he has not had any significant aggravation of the R knee since starting therapy  Objective: See treatment diary below  Precautions: None    Daily Treatment Diary     Manual  1/15 1/17 1/22 1/24 1/29 1/31       Patellar Mobz - PROM  8' 8' np 5 min PROM  5 min       STM - 5' 2' np                                                    Exercise Diary  1/15 1/17 1/22 1/24 1/29 1/31       Bike - L1 10 min L2 10 min L2 10 min L2 10 min L2 10 min       Heel Slides with QS 5"/5" 3 min 5"/5"  3min 4"/4" 4 min 5"/5"  5 min 5"/5" 5 min 5"/5" 5 min       SLR 20x 20x 30x 30X 1 5# 20x 1 5# 20x       SLR abduction - 20x 20x 30x 1 5# 20x 1 5# 20x       Quad Stretch 4x30" 4x30"  HEP 4x30" 4x30" 4x30" 4x30"       Hamstring Stretch with strap 4x30" 4x30"  HEP 4x30" 4x30" 4x30" 4x30"       Runner's Stretch 4x30" 4x30"  HEP 4x30" 4x30" 4x30" 4x30"       Ball Squats - 10x 20x 20x 20x 30x       IT band stretch - 4x30"  HEP 4x30" 4x30" 4x30" 4x30"       Sit to Stands - - 20x 20x 20x 30x       Heel Rise with TKE - - 30x 30x 30x 30x       SAQ - - 30x 30X 1 5# 30x 1 5# 30x       IT band stretch with strap - - 4x30" 4x30"         SLR Extensions - - - - 1 5# 20x 1 5# 20x       Ball Squeezes in HL - - - - 5"/5" 3 min 5"/5" 3 min                                                                            Modalities  1/15 1/17 1/22 1/23  1/31       HV with CP - -                                         Assessment: Tolerated treatment well  Reps increased as tolerated by patient  Requires minimal to no verbal cueing with for proper form/posture    Patient noted he could feel he "did work" and will probably be a little fatigued later today  Patient would benefit from continued PT for additional strengthening  Plan: Continue per plan of care  Progress treatment as tolerated

## 2019-02-05 ENCOUNTER — OFFICE VISIT (OUTPATIENT)
Dept: PHYSICAL THERAPY | Facility: CLINIC | Age: 74
End: 2019-02-05
Payer: COMMERCIAL

## 2019-02-05 DIAGNOSIS — M25.561 ACUTE PAIN OF RIGHT KNEE: Primary | ICD-10-CM

## 2019-02-05 PROCEDURE — 97112 NEUROMUSCULAR REEDUCATION: CPT | Performed by: PHYSICAL THERAPIST

## 2019-02-05 PROCEDURE — 97110 THERAPEUTIC EXERCISES: CPT | Performed by: PHYSICAL THERAPIST

## 2019-02-05 PROCEDURE — 97140 MANUAL THERAPY 1/> REGIONS: CPT | Performed by: PHYSICAL THERAPIST

## 2019-02-05 NOTE — PROGRESS NOTES
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 [employer] yesterday and notes B medial knee pain at end of hour 3  He states improvement continues to be noted  Terminal knee extension and VMo strength remain limited and should improve pain/stability levels as they increase  Pt  Would benefit from continued PT to maximize function  Plan: Continue per plan of care  Progress treatment as tolerated        Precautions: None    Daily Treatment Diary     Manual  1/15 1/17 1/22 1/24 1/29 1/31 2/5      Patellar Mobz - PROM  8' 8' np 5 min PROM  5 min 8'      STM - 5' 2' np         PROM - - - - - - Extension with distraction, 3'                                    Exercise Diary  1/15 1/17 1/22 1/24 1/29 1/31 2/5      Bike - L1 10 min L2 10 min L2 10 min L2 10 min L2 10 min L2 10 min      Heel Slides with QS 5"/5" 3 min 5"/5"  3min 4"/4" 4 min 5"/5"  5 min 5"/5" 5 min 5"/5" 5 min 5"/5" 4 min      SLR 20x 20x 30x 30X 1 5# 20x 1 5# 20x 2# 20x      SLR abduction - 20x 20x 30x 1 5# 20x 1 5# 20x 2# 20x      Quad Stretch 4x30" 4x30"  HEP 4x30" 4x30" 4x30" 4x30" 4x30"      Hamstring Stretch with strap 4x30" 4x30"  HEP 4x30" 4x30" 4x30" 4x30" 4x30"      Runner's Stretch 4x30" 4x30"  HEP 4x30" 4x30" 4x30" 4x30" 4x30"      Ball Squats - 10x 20x 20x 20x 30x 30x      IT band stretch - 4x30"  HEP 4x30" 4x30" 4x30" 4x30" 4x30"      Sit to Stands - - 20x 20x 20x 30x 30x       Heel Rise with TKE - - 30x 30x 30x 30x 30x 10#     SAQ - - 30x 30X 1 5# 30x 1 5# 30x 2# 30x      IT band stretch with strap - - 4x30" 4x30" SLR Extensions - - - - 1 5# 20x 1 5# 20x 2# 20x      Ball Squeezes in HL - - - - 5"/5" 3 min 5"/5" 3 min 5"/5" 3 min      SLS on AE - - - - - - Blue, 2x1 min                                                              Modalities  1/15 1/17 1/22 1/23  1/31 2/5      HV with CP - -     10 min CP only

## 2019-02-07 ENCOUNTER — OFFICE VISIT (OUTPATIENT)
Dept: PHYSICAL THERAPY | Facility: CLINIC | Age: 74
End: 2019-02-07
Payer: COMMERCIAL

## 2019-02-07 DIAGNOSIS — M25.561 ACUTE PAIN OF RIGHT KNEE: Primary | ICD-10-CM

## 2019-02-07 PROCEDURE — 97110 THERAPEUTIC EXERCISES: CPT

## 2019-02-07 NOTE — PROGRESS NOTES
Daily Note     Today's date: 2019  Patient name: Mariaelena Aguero  : 1945  MRN: [id number]  Referring provider: Nino Lewis MD  Dx:   Encounter Diagnosis     ICD-10-CM    1  Acute pain of right knee M25 561                   Subjective: Patient reports has no pain today  Reports stiffness is the same it has been  Objective: See treatment diary below      Assessment: Mariaelena Aguero continues with stiffness  Strength appears to be improving in right lower extremity  Jermaine Escalante was able to perform exercises with proper intensity and technique with minimal to moderate verbal cueing  Mariaelena Aguero should benefit from continued PT to restore PLOF  Plan: Continue per plan of care         Precautions: None    Daily Treatment Diary     Manual  1/15 1/17 1/22 1/24 1/29 1/31 2 2     Patellar Mobz - PROM  8' 8' np 5 min PROM  5 min 8' 8'     STM - 5' 2' np         PROM - - - - - - Extension with distraction, 3' Extension with distraction, 3'                                   Exercise Diary  1/15 1/17 1/22 1/24 1/29 1/31 2/5 2/7  RE   Bike - L1 10 min L2 10 min L2 10 min L2 10 min L2 10 min L2 10 min L2 10 min     Heel Slides with QS 5"/5" 3 min 5"/5"  3min 4"/4" 4 min 5"/5"  5 min 5"/5" 5 min 5"/5" 5 min 5"/5" 4 min 5"/5" 4 min     SLR 20x 20x 30x 30X 1 5# 20x 1 5# 20x 2# 20x 2#  2x10     SLR abduction - 20x 20x 30x 1 5# 20x 1 5# 20x 2# 20x 2#  2x10     Quad Stretch 4x30" 4x30"  HEP 4x30" 4x30" 4x30" 4x30" 4x30" 4x30"     Hamstring Stretch with strap 4x30" 4x30"  HEP 4x30" 4x30" 4x30" 4x30" 4x30" 4x30"     Runner's Stretch 4x30" 4x30"  HEP 4x30" 4x30" 4x30" 4x30" 4x30" 4x30"     Ball Squats - 10x 20x 20x 20x 30x 30x      IT band stretch - 4x30"  HEP 4x30" 4x30" 4x30" 4x30" 4x30" 4x30"     Sit to Stands - - 20x 20x 20x 30x 30x  x30     Heel Rise with TKE - - 30x 30x 30x 30x 30x 10#     SAQ - - 30x 30X 1 5# 30x 1 5# 30x 2# 30x 2# 30x     IT band stretch with strap - - 4x30" 4x30"         SLR Extensions - - - - 1 5# 20x 1 5# 20x 2# 20x 2#  2x10     Ball Squeezes in HL - - - - 5"/5" 3 min 5"/5" 3 min 5"/5" 3 min 5"/5"  3 min     SLS on AE - - - - - - Blue, 2x1 min Blue, 2x1 min                                                             Modalities  1/15 1/17 1/22 1/23  1/31 2/5      HV with CP - -     10 min CP only

## 2019-02-12 ENCOUNTER — APPOINTMENT (OUTPATIENT)
Dept: PHYSICAL THERAPY | Facility: CLINIC | Age: 74
End: 2019-02-12
Payer: COMMERCIAL

## 2019-02-13 ENCOUNTER — EVALUATION (OUTPATIENT)
Dept: PHYSICAL THERAPY | Facility: CLINIC | Age: 74
End: 2019-02-13
Payer: COMMERCIAL

## 2019-02-13 DIAGNOSIS — M25.561 ACUTE PAIN OF RIGHT KNEE: Primary | ICD-10-CM

## 2019-02-13 PROCEDURE — 97112 NEUROMUSCULAR REEDUCATION: CPT | Performed by: PHYSICAL THERAPIST

## 2019-02-13 PROCEDURE — 97110 THERAPEUTIC EXERCISES: CPT | Performed by: PHYSICAL THERAPIST

## 2019-02-13 NOTE — PROGRESS NOTES
PT Re-Evaluation     Today's date: 2019  Patient name: Shannan Frye  : 1945  MRN: [id number]  Referring provider: [place] S[place] MD  Dx:   Encounter Diagnosis     ICD-10-CM    1  Acute pain of right knee M25 561                   Assessment  Assessment details: Shannan Frye has continued with PT 2-3x/week, progressing as tolerated with treatment for Acute pain of right knee  (primary encounter diagnosis)    Pt  Is demonstrating improved objective measures and functional performance, but deficits remain and are limiting daily life  Pt  Would benefit from continued PT to further promote maximum activity tolerance and return to PLOF  Understanding of Dx/Px/POC: good   Prognosis: good    Goals    Short Term Goals:  - Decrease pain by 50% in 3 weeks - MEt  - Increase ROM by 50% in 4 weeks - Met  - Increase strength by 50% in 4 weeks - Partially Met  VMO weakness remains    Long Term Goals:  - Independent with comprehensive home exercise program at discharge  - Increase Functional Status Measure to: 80  - Increase strength equal to contralateral side at discharge  - Increase ROM to [facility] at discharge    Plan  Patient would benefit from: skilled physical therapy  Planned modality interventions: cryotherapy, unattended electrical stimulation, thermotherapy: hydrocollator packs and high voltage pulsed current: pain management  Planned therapy interventions: abdominal trunk stabilization, balance/weight bearing training, body mechanics training, home exercise program, therapeutic exercise, therapeutic activities, stretching, strengthening, neuromuscular re-education, [place] taping and joint mobilization  Frequency: 2x week  Duration in weeks: 2  Treatment plan discussed with: patient        Subjective Evaluation    History of Present Illness  Date of onset: 1/10/2019  Mechanism of injury: Pt  Reports pain is significantly better since beginning therapy   Primary issue remains LE weakness/instability and poor balance  He feels he getting more I with exercise and will be ready to DC in 2 weeks  Pt  Is motivated to continue making progress with therapy  Pain  Current pain ratin  At best pain ratin  At worst pain ratin  Location: anterior knee  Quality: dull ache, pressure and grinding  Relieving factors: ice, relaxation and rest  Aggravating factors: walking, standing, stair climbing, running and lifting  Progression: improved      Diagnostic Tests  X-ray: abnormal  Patient Goals  Patient goals for therapy: decreased edema, decreased pain, improved balance, increased motion, return to sport/leisure activities, independence with ADLs/IADLs and increased strength  Patient goal: be able to help son in garage        Objective     Static Posture     Knee   Genu varus  Observations     Right Knee   Negative for edema and effusion  Palpation     Right   Tenderness of the vastus medialis  Trigger point to medial gastrocnemius  Tenderness     Right Knee   Tenderness in the quadriceps tendon  Neurological Testing     Sensation     Knee   Left Knee   Intact: light touch    Right Knee   Intact: light touch     Reflexes   Left   Patellar (L4): normal (2+)    Right   Patellar (L4): normal (2+)    Active Range of Motion   Left Knee   Normal active range of motion  Flexion: 129 degrees     Right Knee   Flexion: 125 degrees   Extension: -4 degrees     Mobility   Patellar Mobility:     Right Knee   Hypomobile: lateral     Patellar Static Positioning   Right Knee: medial tilt and jayna    Strength/Myotome Testing     Left Knee   Normal strength    Right Knee   Flexion: 4+  Extension: 4    Tests     Right Knee   Positive medial Eusebio  Negative anterior drawer, patella-femoral grind, valgus stress test at 30 degrees and varus stress test at 30 degrees          Precautions: None    Daily Treatment Diary     Manual  1/15 1/17 1/22 1/24 1/29 1/31 2/5 2/7 2/13    Patellar Mobz - PROM  8' 8' np 5 min PROM  5 min 8' 8' 8'    STM - 5' 2' np         PROM - - - - - - Extension with distraction, 3' Extension with distraction, 3' -                                  Exercise Diary  1/15 1/17 1/22 1/24 1/29 1/31 2/5 2/7 2/13    Bike - L1 10 min L2 10 min L2 10 min L2 10 min L2 10 min L2 10 min L2 10 min L3 10 min    Heel Slides with QS 5"/5" 3 min 5"/5"  3min 4"/4" 4 min 5"/5"  5 min 5"/5" 5 min 5"/5" 5 min 5"/5" 4 min 5"/5" 4 min 5"/5" 4 min    SLR 20x 20x 30x 30X 1 5# 20x 1 5# 20x 2# 20x 2#  2x10 -    SLR abduction - 20x 20x 30x 1 5# 20x 1 5# 20x 2# 20x 2#  2x10 -    Quad Stretch 4x30" 4x30"  HEP 4x30" 4x30" 4x30" 4x30" 4x30" 4x30" 4x30"    Hamstring Stretch with strap 4x30" 4x30"  HEP 4x30" 4x30" 4x30" 4x30" 4x30" 4x30" 4x30"    Runner's Stretch 4x30" 4x30"  HEP 4x30" 4x30" 4x30" 4x30" 4x30" 4x30" 4x30"    Ball Squats - 10x 20x 20x 20x 30x 30x  30x    IT band stretch - 4x30"  HEP 4x30" 4x30" 4x30" 4x30" 4x30" 4x30" 4x30"    Sit to Stands - - 20x 20x 20x 30x 30x  x30 30x    Heel Rise with TKE - - 30x 30x 30x 30x 30x 10# -    SAQ - - 30x 30X 1 5# 30x 1 5# 30x 2# 30x 2# 30x 2# 30x    IT band stretch with strap - - 4x30" 4x30"     -    SLR Extensions - - - - 1 5# 20x 1 5# 20x 2# 20x 2#  2x10 -    Ball Squeezes in HL - - - - 5"/5" 3 min 5"/5" 3 min 5"/5" 3 min 5"/5"  3 min -    SLS on AE - - - - - - Blue, 2x1 min Blue, 2x1 min Blue 2x1 min    TS with eyes closed         2x1 min ea                                                Modalities  1/15 1/17 1/22 1/23  1/31 2/5  2/13    HV with CP - -     10 min CP only  10 mi cp only

## 2019-02-14 ENCOUNTER — OFFICE VISIT (OUTPATIENT)
Dept: PHYSICAL THERAPY | Facility: CLINIC | Age: 74
End: 2019-02-14
Payer: COMMERCIAL

## 2019-02-14 DIAGNOSIS — M25.561 ACUTE PAIN OF RIGHT KNEE: Primary | ICD-10-CM

## 2019-02-14 PROCEDURE — 97110 THERAPEUTIC EXERCISES: CPT | Performed by: PHYSICAL THERAPIST

## 2019-02-14 PROCEDURE — 97112 NEUROMUSCULAR REEDUCATION: CPT | Performed by: PHYSICAL THERAPIST

## 2019-02-14 PROCEDURE — 97010 HOT OR COLD PACKS THERAPY: CPT | Performed by: PHYSICAL THERAPIST

## 2019-02-14 NOTE — PROGRESS NOTES
Daily Note     Today's date: 2019  Patient name: Lashay Muñiz  : 1945  MRN: [id number]  Referring provider: Allie Garcia MD  Dx:   Encounter Diagnosis     ICD-10-CM    1  Acute pain of right knee M25 561                   Subjective: PT  Notes pain in knees is still well controlled, but does have soreness secondary to therapy progression yesterday  Objective: See treatment diary below      Assessment: Tolerated treatment well  Patient demonstrated fatigue post treatment, exhibited good technique with therapeutic exercises and would benefit from continued PT      Plan: Continue per plan of care  Progress treatment as tolerated  2 more visits         Precautions: None    Daily Treatment Diary     Manual  1/15 1/17 1/22 1/24 1/29 1/31 2/5 2/7 2/13    Patellar Mobz - PROM  8' 8' np 5 min PROM  5 min 8' 8' 8'    STM - 5' 2' np         PROM - - - - - - Extension with distraction, 3' Extension with distraction, 3' -                                  Exercise Diary  1/15 1/17 1/22 1/24 1/29 1/31 2/5 2/7 2/13 2/14   Bike - L1 10 min L2 10 min L2 10 min L2 10 min L2 10 min L2 10 min L2 10 min L3 10 min L3 10 min   Heel Slides with QS 5"/5" 3 min 5"/5"  3min 4"/4" 4 min 5"/5"  5 min 5"/5" 5 min 5"/5" 5 min 5"/5" 4 min 5"/5" 4 min 5"/5" 4 min 5"/5" 4 min   SLR 20x 20x 30x 30X 1 5# 20x 1 5# 20x 2# 20x 2#  2x10 - 2# 30x   SLR abduction - 20x 20x 30x 1 5# 20x 1 5# 20x 2# 20x 2#  2x10 - 2# 30x   Quad Stretch 4x30" 4x30"  HEP 4x30" 4x30" 4x30" 4x30" 4x30" 4x30" 4x30" 4x30"   Hamstring Stretch with strap 4x30" 4x30"  HEP 4x30" 4x30" 4x30" 4x30" 4x30" 4x30" 4x30" 4x30"   Runner's Stretch 4x30" 4x30"  HEP 4x30" 4x30" 4x30" 4x30" 4x30" 4x30" 4x30" 4x30"   Ball Squats - 10x 20x 20x 20x 30x 30x  30x 2x10   IT band stretch - 4x30"  HEP 4x30" 4x30" 4x30" 4x30" 4x30" 4x30" 4x30" 4x30"   Sit to Stands - - 20x 20x 20x 30x 30x  x30 30x 20x   Heel Rise with TKE - - 30x 30x 30x 30x 30x 10# - On AE 30x   SAQ - - 30x 30X 1 5# 30x 1 5# 30x 2# 30x 2# 30x 2# 30x 2# 30x   IT band stretch with strap - - 4x30" 4x30"     -    SLR Extensions - - - - 1 5# 20x 1 5# 20x 2# 20x 2#  2x10 - 2# 30x   Ball Squeezes in HL - - - - 5"/5" 3 min 5"/5" 3 min 5"/5" 3 min 5"/5"  3 min - 5"/5" 3 min   SLS on AE - - - - - - Blue, 2x1 min Blue, 2x1 min Blue 2x1 min Blue 2x1 min   TS with eyes closed         2x1 min ea  2x1 min ea    Squats on AE - - - - - - - - - 20x                                 Modalities  1/15 1/17 1/22 1/23  1/31 2/5  2/13 2/14   HV with CP - -     10 min CP only  10 mi cp only CP only

## 2019-02-19 ENCOUNTER — OFFICE VISIT (OUTPATIENT)
Dept: PHYSICAL THERAPY | Facility: CLINIC | Age: 74
End: 2019-02-19
Payer: COMMERCIAL

## 2019-02-19 DIAGNOSIS — M25.561 ACUTE PAIN OF RIGHT KNEE: Primary | ICD-10-CM

## 2019-02-19 PROCEDURE — 97112 NEUROMUSCULAR REEDUCATION: CPT | Performed by: PHYSICAL THERAPIST

## 2019-02-19 PROCEDURE — 97110 THERAPEUTIC EXERCISES: CPT | Performed by: PHYSICAL THERAPIST

## 2019-02-19 NOTE — PROGRESS NOTES
Daily Note     Today's date: 2019  Patient name: Kristel Escalante  : 1945  MRN: [id number]  Referring provider: Sara Peralta MD  Dx:   Encounter Diagnosis     ICD-10-CM    1  Acute pain of right knee M25 561                   Subjective: Pt  Notes some knee soreness  but it was minimal        Objective: See treatment diary below      Assessment: Stabilization exercises effective at causing fatigue without pain  Pt  Notes knees are pain free following session  We are planning DC at next visit to [facility] as he is gaining more I with exercise  Pt  AROM is improved nicely  Plan: Continue per plan of care  Progress treament per protocol       Precautions: None    Daily Treatment Diary     Manual              Patellar Mobz 5 min            STM -            PROM -                                          Exercise Diary              Bike L3 10 mins            Heel Slides with QS 5"/5" 5 min            SLR 2 5# 3x10 flex and abd            SLR abduction 2 5# 3x10            Quad Stretch 4x30"            Hamstring Stretch with strap 4x30"            Runner's Stretch 4x30"            Ball Squats -            IT band stretch 4x30"            Sit to Stands 3x10            Heel Rise with TKE On AE 30x            SAQ 2 5# 30x            IT band stretch with strap             SLR Extensions 2 5# 30x            Ball Squeezes in HL 3 min 5"/5"            SLS on AE 2x1' ea on Green AE             TS with eyes closed 2x1' ea            Squats on AE 30x                                          Modalities              HV with CP

## 2019-02-21 ENCOUNTER — OFFICE VISIT (OUTPATIENT)
Dept: PHYSICAL THERAPY | Facility: CLINIC | Age: 74
End: 2019-02-21
Payer: COMMERCIAL

## 2019-02-21 DIAGNOSIS — M25.561 ACUTE PAIN OF RIGHT KNEE: Primary | ICD-10-CM

## 2019-02-21 PROCEDURE — 97110 THERAPEUTIC EXERCISES: CPT | Performed by: PHYSICAL THERAPIST

## 2019-02-21 PROCEDURE — 97140 MANUAL THERAPY 1/> REGIONS: CPT | Performed by: PHYSICAL THERAPIST

## 2019-02-21 PROCEDURE — 97112 NEUROMUSCULAR REEDUCATION: CPT | Performed by: PHYSICAL THERAPIST

## 2019-02-21 NOTE — PROGRESS NOTES
Daily Note/Discharge     Today's date: 2019  Patient name: Marianela Coffman  : 1945  MRN: [id number]  Referring provider: Krystian Weiss MD  Dx:   Encounter Diagnosis     ICD-10-CM    1  Acute pain of right knee M25 561                   Subjective: Pt  Denies any knee pain in past week  He has had mm soreness in B hips and quads which he is not used to but states he feels is a good thing  Objective: See treatment diary below      Assessment: Marianela Coffman has continued with PT 2-3x/week, progressing as tolerated with treatment for R knee pain  Pt  Is demonstrating objective improvements since beginning therapy and has achieved established goals  Pt  Notes returning to PLOF and is pleased with progress made in therapy  Recommend d/c to [facility] at this time  Plan: DC at this time to [facility]   F/u prn    Precautions: None    Daily Treatment Diary     Manual             Patellar Mobz 5 min 5 min           STM - 5 min           PROM -                                        Exercise Diary             Bike L3 10 mins L3 10 min           Heel Slides with QS 5"/5" 5 min 5"/5" 5 min           SLR 2 5# 3x10 flex and abd 3# 30x ea           SLR abduction 2 5# 3x10 3# 30x           Quad Stretch 4x30"            Hamstring Stretch with strap 4x30" 4x30"           Runner's Stretch 4x30" 4x30"           Ball Squats -            IT band stretch 4x30" 4x30"           Sit to Stands 3x10 30x no break           Heel Rise with TKE On AE 30x On AE 30x           SAQ 2 5# 30x 3# 30x           IT band stretch with strap  -           SLR Extensions 2 5# 30x 3# 30x           Ball Squeezes in HL 3 min 5"/5" 5 min 5"/5"           SLS on AE 2x1' ea on Green AE  -           TS with eyes closed 2x1' ea On AE           Squats on AE 30x 30x                                         Modalities              HV with CP

## 2019-02-26 ENCOUNTER — APPOINTMENT (OUTPATIENT)
Dept: PHYSICAL THERAPY | Facility: CLINIC | Age: 74
End: 2019-02-26
Payer: COMMERCIAL

## 2019-02-28 ENCOUNTER — APPOINTMENT (OUTPATIENT)
Dept: PHYSICAL THERAPY | Facility: CLINIC | Age: 74
End: 2019-02-28
Payer: COMMERCIAL

## 2019-09-04 LAB — PSA SERPL-MCNC: 2.3 NG/ML

## 2019-09-11 ENCOUNTER — CLINICAL SUPPORT (OUTPATIENT)
Dept: RADIATION ONCOLOGY | Facility: CLINIC | Age: 74
End: 2019-09-11
Attending: RADIOLOGY
Payer: COMMERCIAL

## 2019-09-11 VITALS
DIASTOLIC BLOOD PRESSURE: 82 MMHG | RESPIRATION RATE: 14 BRPM | WEIGHT: 224.3 LBS | TEMPERATURE: 97.6 F | SYSTOLIC BLOOD PRESSURE: 116 MMHG | OXYGEN SATURATION: 96 % | HEART RATE: 66 BPM | HEIGHT: 69 IN | BODY MASS INDEX: 33.22 KG/M2

## 2019-09-11 DIAGNOSIS — C61 PROSTATE CANCER (HCC): Primary | ICD-10-CM

## 2019-09-11 PROCEDURE — 99211 OFF/OP EST MAY X REQ PHY/QHP: CPT | Performed by: RADIOLOGY

## 2019-09-11 PROCEDURE — G0463 HOSPITAL OUTPT CLINIC VISIT: HCPCS | Performed by: RADIOLOGY

## 2019-09-11 NOTE — PROGRESS NOTES
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    Jayson Pryor is a 76y o  year old male who returns for follow up post prostate radiation completed on 4/1/2009  He was last seen in follow up on 9/20/18  Interval History:   Lab Results  Component     Value   Date              PSA     2 3       09/03/2019              PSA     1 8       09/07/2018              PSA     1 3       08/30/2017              PSA     1 3       02/01/2017       He reports he is feeling well  He is having no gastrointestinal nor genitourinary complaints  He has stable nocturia once each night  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  He is eating well and trying to be careful not to gain any weight  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 with the right side greater than left side  He is having more difficulty with erections and is on Viagra 100 mg tablets  He was given refill today  Historical Information   Oncology History                Prostate cancer ([facility] )    11/25/2008 Initial Diagnosis     Prostate cancer ([facility] )  - pretreatment PSA was 4 2 NG/ML        11/25/2008 Biopsy     Prostate biopsy: adenocarcinoma, primarily Berna score 3+3=6 with one focus within the left lateral mid gland with [place] score 3+4=7     - Dr Jenny Gilmore      11/25/2008 -  Cancer Staged     Stage II, T2 N0 M0, [place] 7      2/2/2009 - 4/1/2009 Radiation     TomoTherapy - site: prostate   Dose 7560 cGy at [address]         Past Medical History:   Diagnosis Date    Prostate cancer [facility]) 11/25/2008     Past Surgical History:   Procedure Laterality Date    COLONOSCOPY      PROSTATE BIOPSY  11/25/2008    TONSILLECTOMY AND ADENOIDECTOMY      age 25       Social History   Social History     Substance and Sexual Activity   Alcohol Use Yes  Alcohol/week: 8 0 standard drinks    Types: 8 Standard drinks or equivalent per week     Social History     Substance and Sexual Activity   Drug Use No     Social History     Tobacco Use   Smoking Status Former Smoker    Last attempt to quit: Preeti 49 Years since quittin 7   Smokeless Tobacco Never Used     Meds/Allergies     Current Outpatient Medications:     aspirin 81 mg chewable tablet, Chew 1 tablet Daily, Disp: , Rfl:     Cholecalciferol 2000 units CAPS, Take 1 capsule by mouth daily, Disp: , Rfl:     DAILY MULTIPLE VITAMINS PO, Take 1 tablet by mouth daily, Disp: , Rfl:     glucosamine-chondroitin 500-400 MG tablet, Take 1 tablet by mouth daily, Disp: , Rfl:     lisinopril (ZESTRIL) 20 mg tablet, Take 20 mg by mouth daily Takes 1/2 tab, Disp: , Rfl:     rosuvastatin (CRESTOR) 20 MG tablet, Take 1 tablet by mouth daily, Disp: , Rfl:     Methylprednisolone 4 MG TBPK, Use as directed on package (Patient not taking: Reported on 2019), Disp: 21 tablet, Rfl: 0  Allergies   Allergen Reactions    Erythromycin Base Nausea Only    Penicillins Hives     Review of Systems  Constitutional: Negative  HENT: Negative  Eyes: Negative  Respiratory: Negative  Cardiovascular: Negative  Gastrointestinal: Negative  Endocrine: Negative  Genitourinary: Negative  Musculoskeletal: Positive for arthralgias (rt knee)  Skin: Negative  Allergic/Immunologic: Positive for environmental allergies  Hematological: Negative  Psychiatric/Behavioral: Negative  OBJECTIVE:   /82   Pulse 66   Temp 97 6 °F (36 4 °C)   Resp 14   Ht 5' 9" (1 753 m)   Wt 102 kg (224 lb 4 8 oz)   SpO2 96%   BMI 33 12 kg/m²   Pain Assessment:  0  ECOG/[place]ub[place]/WHO: 1 - Symptomatic but completely ambulatory    Physical Exam   Constitutional: He is oriented to person, place, and time  He appears well-developed and well-nourished  No distress  HENT:   Head: Normocephalic and atraumatic  Mouth/Throat: No oropharyngeal exudate  Eyes: Conjunctivae and EOM are normal  Pupils are equal, round, and reactive to light  No scleral icterus  Neck: Normal range of motion  Neck supple  No tracheal deviation present  No thyromegaly present  Cardiovascular: Normal rate, regular rhythm and normal heart sounds  Pulmonary/Chest: Effort normal and breath sounds normal  No respiratory distress  He has no wheezes  He has no rales  He exhibits no tenderness  Abdominal: Soft  Bowel sounds are normal  He exhibits no distension and no mass  There is no tenderness  Genitourinary: Rectum normal and prostate normal    Genitourinary Comments: Prostate is smooth flat and without any induration or nodularity  Musculoskeletal: Normal range of motion  He exhibits no edema or tenderness  Lymphadenopathy:     He has no cervical adenopathy  Right: No inguinal and no supraclavicular adenopathy present  Left: No inguinal and no supraclavicular adenopathy present  Neurological: He is alert and oriented to person, place, and time  No cranial nerve deficit  Coordination normal    Skin: Skin is warm and dry  No rash noted  He is not diaphoretic  No erythema  No pallor  Psychiatric: He has a normal mood and affect  His behavior is normal  Judgment and thought content normal    Nursing note and vitals reviewed  RESULTS    Lab Results:   Recent Results (from the past 672 hour(s))   PSA, Total Screen    Collection Time: 09/03/19  9:11 AM   Result Value Ref Range    Prostate Specific Antigen Total 2 3 < OR = 4 0 ng/mL     Imaging Studies:No results found  Assessment/Plan:  Orders Placed This Encounter   Procedures    PSA Total, Diagnostic        Candace Fishman is a 76y o  year old male who is now 10 5 years post the completion of definitive radiation therapy for Berna score 6 stage II prostate adenocarcinoma  He completed treatment on April 1, 2009 with TomoTherapy    His pretreatment PSA was 4 2 NG/ML  His PSA level in May 2014 was stable at 0 8 ng/ML  This was repeated in November 2014 and was 0 7 NG/ML  Repeat PSA level November 24, 2015 was 0 9 NG/ML  His PSA level performed February 1, 2017 was 1 3 NG/ML  His PSA level August 30, 2017 was stable at 1 3 NG/mL  His PSA level from September 9, 2018 was 1 8 NG/mL  His most recent PSA level from September 3, 2019 was 2 3 NG/mL  We discussed with the patient and his wife that 10 5 years after completion of treatment there can sometimes be a slight rise in the PSA level that will then reach a plateau  Examination clinically today reveals no evidence of recurrence  If his PSA continues to rise, he could be considered for androgen deprivation therapy  He would like to continue with observation  He will return in 12 months with a repeat PSA level        Lydia Cole MD  7/54/5979,8:94 PM    Portions of the record may have been created with voice recognition software   Occasional wrong word or "sound a like" substitutions may have occurred due to the inherent limitations of voice recognition software   Read the chart carefully and recognize, using context, where substitutions have occurred

## 2019-09-11 NOTE — PROGRESS NOTES
Julieth Bush 7/81/1884 is a 76 y o  male       Follow up visit   Returns for follow up post prostate radiation completed on 4/1/2009  He was last seen in follow up on 9/20/19    Lab Results  Component Value Date   PSA 2 3 09/03/2019   PSA 1 8 09/07/2018              PSA     1 3       08/30/2017              PSA     1 3       02/01/2017       Oncology History    Returns for follow up post prostate radiation completed on 4/1/2009  He was last seen in follow up on 9/20/19    Lab Results  Component Value Date   PSA 2 3 09/03/2019   PSA 1 8 09/07/2018              PSA     1 3       08/30/2017              PSA     1 3       02/01/2017               Prostate cancer ([facility] )    11/25/2008 Initial Diagnosis     Prostate cancer ([facility] )  - pretreatment PSA was 4 2 NG/ML        11/25/2008 Biopsy     Prostate biopsy: adenocarcinoma, primarily [place] score 3+3=6 with one focus within the left lateral mid gland with Berna score 3+4=7     - Dr Melissa Lara      11/25/2008 -  Cancer Staged     Stage II, T2 N0 M0, [place] 7      2/2/2009 - 4/1/2009 Radiation     TomoTherapy - site: prostate   Dose 7560 cGy at [facility] Trial: no      Health Maintenance   Topic Date Due    Hepatitis C Screening  1945   Rafa Barber Medicare Annual Wellness Visit (AWV)  1945    CRC Screening: Colonoscopy  1945    BMI: Followup Plan  08/13/1963    DTaP,Tdap,and Td Vaccines (1 - Tdap) 08/13/1966    Pneumococcal Vaccine: 65+ Years (1 of 2 - PCV13) 08/13/2010    PT PLAN OF CARE  02/14/2019    INFLUENZA VACCINE  07/01/2019    BMI: Adult  01/11/2020    Fall Risk  01/15/2020    Depression Screening PHQ  01/15/2020    Pneumococcal Vaccine: Pediatrics (0 to 5 Years) and At-Risk Patients (6 to 59 Years)  Aged Out    HEPATITIS B VACCINES  Aged Out       Patient Active Problem List   Diagnosis    Prostate cancer [facility])     Past Medical History:   Diagnosis Date    Prostate cancer ([facility] ) 2008     Past Surgical History:   Procedure Laterality Date    COLONOSCOPY      PROSTATE BIOPSY  2008    TONSILLECTOMY AND ADENOIDECTOMY      age 25     Family History   Problem Relation Age of Onset    Rectal cancer Father     Colon cancer Paternal Grandfather      Social History     Socioeconomic History    Marital status: /Civil Union     Spouse name: Not on file    Number of children: Not on file    Years of education: Not on file    Highest education level: Not on file   Occupational History    Not on file   Social Needs    Financial resource strain: Not on file    Food insecurity:     Worry: Not on file     Inability: Not on file    Transportation needs:     Medical: Not on file     Non-medical: Not on file   Tobacco Use    Smoking status: Former Smoker     Last attempt to quit:      Years since quittin 7    Smokeless tobacco: Never Used   Substance and Sexual Activity    Alcohol use:  Yes     Alcohol/week: 8 0 standard drinks     Types: 8 Standard drinks or equivalent per week    Drug use: No    Sexual activity: Not on file   Lifestyle    Physical activity:     Days per week: Not on file     Minutes per session: Not on file    Stress: Not on file   Relationships    Social connections:     Talks on phone: Not on file     Gets together: Not on file     Attends Mu-ism service: Not on file     Active member of club or organization: Not on file     Attends meetings of clubs or organizations: Not on file     Relationship status: Not on file    Intimate partner violence:     Fear of current or ex partner: Not on file     Emotionally abused: Not on file     Physically abused: Not on file     Forced sexual activity: Not on file   Other Topics Concern    Not on file   Social History Narrative    Not on file       Current Outpatient Medications:     aspirin 81 mg chewable tablet, Chew 1 tablet Daily, Disp: , Rfl:     Cholecalciferol 2000 units CAPS, Take 1 capsule by mouth daily, Disp: , Rfl:     DAILY MULTIPLE VITAMINS PO, Take 1 tablet by mouth daily, Disp: , Rfl:     glucosamine-chondroitin 500-400 MG tablet, Take 1 tablet by mouth daily, Disp: , Rfl:     lisinopril (ZESTRIL) 20 mg tablet, Take 20 mg by mouth daily Takes 1/2 tab, Disp: , Rfl:     rosuvastatin (CRESTOR) 20 MG tablet, Take 1 tablet by mouth daily, Disp: , Rfl:     Methylprednisolone 4 MG TBPK, Use as directed on package (Patient not taking: Reported on 9/11/2019), Disp: 21 tablet, Rfl: 0  Allergies   Allergen Reactions    Erythromycin Base Nausea Only    Penicillins Hives       Review of Systems:  Review of Systems   Constitutional: Negative  HENT: Negative  Eyes: Negative  Respiratory: Negative  Cardiovascular: Negative  Gastrointestinal: Negative  Endocrine: Negative  Genitourinary: Negative  Musculoskeletal: Positive for arthralgias (rt knee)  Skin: Negative  Allergic/Immunologic: Positive for environmental allergies  Hematological: Negative  Psychiatric/Behavioral: Negative  Vitals:    09/11/19 1440   BP: 116/82   Pulse: 66   Resp: 14   Temp: 97 6 °F (36 4 °C)   SpO2: 96%   Weight: 102 kg (224 lb 4 8 oz)   Height: 5' 9" (1 753 m)        Pain assessment:0         Prostate Specific Antigen Total   Date Value Ref Range Status   09/03/2019 2 3 < OR = 4 0 ng/mL Final     Comment:     The total PSA value from this assay system is   standardized against the WHO standard  The test   result will be approximately 20% lower when compared   to the equimolar-standardized total PSA (Neil   Palco)  Comparison of serial PSA results should be   interpreted with this fact in mind  This test was performed using the Siemens   chemiluminescent method  Values obtained from   different assay methods cannot be used  interchangeably  PSA levels, regardless of  value, should not be interpreted as absolute  evidence of the presence or absence of disease       09/07/2018 1 8 < OR = 4 0 ng/mL Final     Comment:     The total PSA value from this assay system is   standardized against the WHO standard  The test   result will be approximately 20% lower when compared   to the equimolar-standardized total PSA (Neil   Saurabh)  Comparison of serial PSA results should be   interpreted with this fact in mind  This test was performed using the Siemens   chemiluminescent method  Values obtained from   different assay methods cannot be used  interchangeably  PSA levels, regardless of  value, should not be interpreted as absolute  evidence of the presence or absence of disease      :    Imaging:No results found

## 2020-07-15 ENCOUNTER — TRANSCRIBE ORDERS (OUTPATIENT)
Dept: ADMINISTRATIVE | Facility: HOSPITAL | Age: 75
End: 2020-07-15

## 2020-07-15 ENCOUNTER — HOSPITAL ENCOUNTER (OUTPATIENT)
Dept: RADIOLOGY | Facility: HOSPITAL | Age: 75
Discharge: HOME | End: 2020-07-15
Payer: COMMERCIAL

## 2020-07-15 DIAGNOSIS — Z01.818 ENCOUNTER FOR OTHER PREPROCEDURAL EXAMINATION: ICD-10-CM

## 2020-07-15 DIAGNOSIS — Z01.818 ENCOUNTER FOR OTHER PREPROCEDURAL EXAMINATION: Primary | ICD-10-CM

## 2020-07-15 PROCEDURE — 71046 X-RAY EXAM CHEST 2 VIEWS: CPT

## 2020-09-04 ENCOUNTER — APPOINTMENT (OUTPATIENT)
Dept: LAB | Facility: HOSPITAL | Age: 75
End: 2020-09-04
Payer: COMMERCIAL

## 2020-09-04 DIAGNOSIS — C61 PROSTATE CANCER (HCC): ICD-10-CM

## 2020-09-04 LAB — PSA SERPL-MCNC: 3.8 NG/ML (ref 0–4)

## 2020-09-04 PROCEDURE — 84153 ASSAY OF PSA TOTAL: CPT

## 2020-09-10 ENCOUNTER — CLINICAL SUPPORT (OUTPATIENT)
Dept: RADIATION ONCOLOGY | Facility: CLINIC | Age: 75
End: 2020-09-10
Attending: RADIOLOGY

## 2020-09-10 ENCOUNTER — TELEMEDICINE (OUTPATIENT)
Dept: RADIATION ONCOLOGY | Facility: CLINIC | Age: 75
End: 2020-09-10
Attending: RADIOLOGY

## 2020-09-10 VITALS — HEIGHT: 69 IN | WEIGHT: 215 LBS | BODY MASS INDEX: 31.84 KG/M2

## 2020-09-10 DIAGNOSIS — R97.21 RISING PSA FOLLOWING TREATMENT FOR MALIGNANT NEOPLASM OF PROSTATE: ICD-10-CM

## 2020-09-10 DIAGNOSIS — C61 PROSTATE CANCER (HCC): Primary | ICD-10-CM

## 2020-09-10 NOTE — PROGRESS NOTES
Virtual Brief Visit    Problem List Items Addressed This Visit     None                Reason for visit is radiation oncology follow-up    Encounter provider Marianne Mustafa MD    Provider located at [id number] [address] [id number]      Recent Visits  No visits were found meeting these conditions  Showing recent visits within past 7 days and meeting all other requirements     Today's Visits  Date Type Provider Dept   09/10/20 Telemedicine Marianne Mustafa MD [facility]   Showing today's visits and meeting all other requirements     Future Appointments  No visits were found meeting these conditions  Showing future appointments within next 150 days and meeting all other requirements        After connecting through telephone, the patient was identified by name and date of birth  Adelaida Sharif was informed that this is a telemedicine visit and that the visit is being conducted through telephone  My office door was closed  No one else was in the room  He acknowledged consent and understanding of privacy and security of the video platform  The patient has agreed to participate and understands they can discontinue the visit at any time  Patient is aware this is a billable service  Subjective  Adelaida Sharif is a 76 y o  male who returns for follow up post prostate radiation completed on 4/1/2009  He was last seen in follow up on 9/11/2019  Reports left knee revision 8/25/20  He had a knee replacement in January  Receiving physical therapy      Lab Results   Component Value Date    PSA 3 8 09/04/2020    PSA 2 3 09/03/2019    PSA 1 8 09/07/2018       Past Medical History:   Diagnosis Date    Cancer [facility])     Prostate Cancer    Prostate cancer ([facility] ) 11/25/2008       Past Surgical History:   Procedure Laterality Date    COLONOSCOPY      PROSTATE BIOPSY  11/25/2008    TONSILLECTOMY AND ADENOIDECTOMY      age 25 Current Outpatient Medications   Medication Sig Dispense Refill    aspirin 81 mg chewable tablet Chew 1 tablet Daily      Cholecalciferol 2000 units CAPS Take 1 capsule by mouth daily      DAILY MULTIPLE VITAMINS PO Take 1 tablet by mouth daily      lisinopril (ZESTRIL) 20 mg tablet Take 20 mg by mouth daily Takes 1/2 tab      OXYCODONE HCL PO Take by mouth Prn after knee surgery, unsure of dose      rosuvastatin (CRESTOR) 20 MG tablet Take 1 tablet by mouth daily      glucosamine-chondroitin 500-400 MG tablet Take 1 tablet by mouth daily      Methylprednisolone 4 MG TBPK Use as directed on package (Patient not taking: Reported on 9/11/2019) 21 tablet 0     No current facility-administered medications for this visit  Allergies   Allergen Reactions    Erythromycin Base Nausea Only    Penicillins Hives         I spent 30 minutes with the patient during this visit  Follow up visit       Oncology History           Prostate cancer ([facility] )   11/25/2008 Initial Diagnosis    Prostate cancer ([facility] )  - pretreatment PSA was 4 2 NG/ML       11/25/2008 Biopsy    Prostate biopsy: adenocarcinoma, primarily Berna score 3+3=6 with one focus within the left lateral mid gland with Berna score 3+4=7     - Dr Paula Izaguirre     11/25/2008 -  Cancer Staged    Stage II, T2 N0 M0, [place] 7     2/2/2009 - 4/1/2009 Radiation    TomoTherapy - site: prostate   Dose 7560 cGy at [facility] Trial: no      Health Maintenance   Topic Date Due    Hepatitis C Screening  1945   Candido Norrie Medicare Annual Wellness Visit (AWV)  1945    BMI: Followup Plan  08/13/1963    DTaP,Tdap,and Td Vaccines (1 - Tdap) 08/13/1966    Colorectal Cancer Screening  08/13/1995    Pneumococcal Vaccine: 65+ Years (1 of 1 - PPSV23) 08/13/2010    PT PLAN OF CARE  02/14/2019    Fall Risk  01/15/2020    Influenza Vaccine  07/01/2020    Depression Screening PHQ  09/11/2020    BMI: Adult 2020    HIB Vaccine  Aged Out    Hepatitis B Vaccine  Aged Out    IPV Vaccine  Aged Out    Hepatitis A Vaccine  Aged Out    Meningococcal ACWY Vaccine  Aged Out    HPV Vaccine  Aged Out       Patient Active Problem List   Diagnosis    Prostate cancer ([facility] )     Past Medical History:   Diagnosis Date    Cancer [facility])     Prostate Cancer    Prostate cancer ([facility] ) 2008     Past Surgical History:   Procedure Laterality Date    COLONOSCOPY      PROSTATE BIOPSY  2008    TONSILLECTOMY AND ADENOIDECTOMY      age 25     Family History   Problem Relation Age of Onset    Rectal cancer Father     Colon cancer Paternal Grandfather      Social History     Socioeconomic History    Marital status: Single     Spouse name: Not on file    Number of children: Not on file    Years of education: Not on file    Highest education level: Not on file   Occupational History    Not on file   Social Needs    Financial resource strain: Not on file    Food insecurity     Worry: Not on file     Inability: Not on file    Transportation needs     Medical: Not on file     Non-medical: Not on file   Tobacco Use    Smoking status: Former Smoker     Last attempt to quit:      Years since quittin 7    Smokeless tobacco: Never Used   Substance and Sexual Activity    Alcohol use:  Yes     Alcohol/week: 8 0 standard drinks     Types: 8 Standard drinks or equivalent per week    Drug use: No    Sexual activity: Not on file   Lifestyle    Physical activity     Days per week: Not on file     Minutes per session: Not on file    Stress: Not on file   Relationships    Social connections     Talks on phone: Not on file     Gets together: Not on file     Attends Jainism service: Not on file     Active member of club or organization: Not on file     Attends meetings of clubs or organizations: Not on file     Relationship status: Not on file    Intimate partner violence     Fear of current or ex partner: Not on file     Emotionally abused: Not on file     Physically abused: Not on file     Forced sexual activity: Not on file   Other Topics Concern    Not on file   Social History Narrative    Not on file       Current Outpatient Medications:     aspirin 81 mg chewable tablet, Chew 1 tablet Daily, Disp: , Rfl:     Cholecalciferol 2000 units CAPS, Take 1 capsule by mouth daily, Disp: , Rfl:     DAILY MULTIPLE VITAMINS PO, Take 1 tablet by mouth daily, Disp: , Rfl:     lisinopril (ZESTRIL) 20 mg tablet, Take 20 mg by mouth daily Takes 1/2 tab, Disp: , Rfl:     OXYCODONE HCL PO, Take by mouth Prn after knee surgery, unsure of dose, Disp: , Rfl:     rosuvastatin (CRESTOR) 20 MG tablet, Take 1 tablet by mouth daily, Disp: , Rfl:     glucosamine-chondroitin 500-400 MG tablet, Take 1 tablet by mouth daily, Disp: , Rfl:     Methylprednisolone 4 MG TBPK, Use as directed on package (Patient not taking: Reported on 9/11/2019), Disp: 21 tablet, Rfl: 0  Allergies   Allergen Reactions    Erythromycin Base Nausea Only    Penicillins Hives       Review of Systems:  Review of Systems   Constitutional: Negative  HENT: Negative  Eyes: Negative  Respiratory: Negative  Cardiovascular: Negative  Gastrointestinal: Negative  Negative for blood in stool  Endocrine: Negative  Genitourinary: Negative for dysuria  Nocturia x 1, reports stream is fair   Musculoskeletal: Negative  Recent left knee surgery   Skin: Negative  Allergic/Immunologic: Negative  Neurological: Negative  Hematological: Negative  Vitals:    09/10/20 1028   Weight: 97 5 kg (215 lb)   Height: 5' 9" (1 753 m)        Pain Score: 0-No pain      Imaging:No results found

## 2020-09-10 NOTE — PROGRESS NOTES
Follow-up - Radiation Oncology   Anne Marie Root 1945 76 y o  male [id number]      History of Present Illness   Cancer Staging  Stage II, T2 N0 M0 [place] score 7 adenocarcinoma the prostate    Anne Marie Root is a 76y o  year old male with a history of prostate adenocarcinoma  Reason for visit is radiation oncology follow-up     Encounter provider Sherrell Carney MD     Provider located at [address] [place] [id number]        Recent Visits  No visits were found meeting these conditions  Showing recent visits within past 7 days and meeting all other requirements      Today's Visits  Date Type Provider Dept   09/10/20 Telemedicine Sherrell Carney MD [facility]   Showing today's visits and meeting all other requirements      Future Appointments  No visits were found meeting these conditions  Showing future appointments within next 150 days and meeting all other requirements         After connecting through telephone, the patient was identified by name and date of birth  Anne Marie Root was informed that this is a telemedicine visit and that the visit is being conducted through telephone  My office door was closed  No one else was in the room  He acknowledged consent and understanding of privacy and security of the video platform  The patient has agreed to participate and understands they can discontinue the visit at any time  It was my intent to perform this visit via video technology but the patient was not able to do a video connection so the visit was completed via audio telephone only      Patient is aware this is a billable service       Subjective  Anne Marie Root is a 76 y o  male who returns for follow up post prostate radiation completed on 4/1/2009  He was last seen in follow up on 9/11/2019  Interval History:   Reports left knee revision 8/25/20   He had a left knee replacement in January 2020 at [facility]  Receiving physical therapy  Lab Results   Component Value Date     PSA 3 8 09/04/2020     PSA 2 3 09/03/2019     PSA 1 8 09/07/2018      He reports he is feeling well  He is having no gastrointestinal nor genitourinary complaints  He has stable nocturia once each night  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  Suha Barajas is eating well and trying to be careful not to gain any weight  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 with the right side greater than left side  He had left knee replacement in January 2020 and then had left knee revision a August 25, 2020 due to not having full range of motion of the knee  He is currently receiving physical therapy  He is has difficulty with erections and uses Viagra 100 mg tablets  He did not need a refill today  Historical Information   Oncology History           Prostate cancer ([facility] )   11/25/2008 Initial Diagnosis    Prostate cancer ([facility] )  - pretreatment PSA was 4 2 NG/ML       11/25/2008 Biopsy    Prostate biopsy: adenocarcinoma, primarily Berna score 3+3=6 with one focus within the left lateral mid gland with [place] score 3+4=7     - Dr Alejandra Mena     11/25/2008 -  Cancer Staged    Stage II, T2 N0 M0, [place] 7     2/2/2009 - 4/1/2009 Radiation    TomoTherapy - site: prostate   Dose 7560 cGy at [address]         Past Medical History:   Diagnosis Date    Cancer [facility])     Prostate Cancer    Prostate cancer [facility]) 11/25/2008     Past Surgical History:   Procedure Laterality Date    COLONOSCOPY      PROSTATE BIOPSY  11/25/2008    TONSILLECTOMY AND ADENOIDECTOMY      age 25       Social History   Social History     Substance and Sexual Activity   Alcohol Use Yes    Alcohol/week: 8 0 standard drinks    Types: 8 Standard drinks or equivalent per week     Social History     Substance and Sexual Activity   Drug Use No     Social History     Tobacco Use   Smoking Status Former Smoker    Last attempt to quit: 200    Years since quittin 7   Smokeless Tobacco Never Used         Meds/Allergies     Current Outpatient Medications:     aspirin 81 mg chewable tablet, Chew 1 tablet Daily, Disp: , Rfl:     Cholecalciferol 2000 units CAPS, Take 1 capsule by mouth daily, Disp: , Rfl:     DAILY MULTIPLE VITAMINS PO, Take 1 tablet by mouth daily, Disp: , Rfl:     lisinopril (ZESTRIL) 20 mg tablet, Take 20 mg by mouth daily Takes 1/2 tab, Disp: , Rfl:     OXYCODONE HCL PO, Take by mouth Prn after knee surgery, unsure of dose, Disp: , Rfl:     rosuvastatin (CRESTOR) 20 MG tablet, Take 1 tablet by mouth daily, Disp: , Rfl:     glucosamine-chondroitin 500-400 MG tablet, Take 1 tablet by mouth daily, Disp: , Rfl:     Methylprednisolone 4 MG TBPK, Use as directed on package (Patient not taking: Reported on 2019), Disp: 21 tablet, Rfl: 0  Allergies   Allergen Reactions    Erythromycin Base Nausea Only    Penicillins Hives     Review of Systems  Constitutional: Negative  HENT: Negative  Eyes: Negative  Respiratory: Negative  Cardiovascular: Negative  Gastrointestinal: Negative  Negative for blood in stool  Endocrine: Negative  Genitourinary: Negative for dysuria  Nocturia x 1, reports stream is fair   Musculoskeletal: Negative  Recent left knee surgery   Skin: Negative  Allergic/Immunologic: Negative  Neurological: Negative  Hematological: Negative  OBJECTIVE:   Ht 5' 9" (1 753 m)   Wt 97 5 kg (215 lb)   BMI 31 75 kg/m²   Pain Assessment:  0  ECOG/Zubrod/WHO: 1 - Symptomatic but completely ambulatory    Physical Exam   Telemedicine phone visit, so no physical examination  RESULTS    Lab Results:   Recent Results (from the past 672 hour(s))   PSA Total, Diagnostic    Collection Time: 20 11:07 AM   Result Value Ref Range    PSA, Diagnostic 3 8 0 0 - 4 0 ng/mL       Imaging Studies:No results found        Assessment/Plan:  Orders Placed This Encounter Procedures    NM PET CT skull base to mid thigh        Ty Moore is a 76y o  year old male who is now 11 5 years post the completion of definitive radiation therapy for [place] score 6 stage II prostate adenocarcinoma  He completed treatment on April 1, 2009 with TomoTherapy at [facility]   His pretreatment PSA was 4 2 NG/ML  His PSA level in May 2014 was stable at 0 8 ng/ML  This was repeated in November 2014 and was 0 7 NG/ML  Repeat PSA level November 24, 2015 was 0 9 NG/ML  His PSA level performed February 1, 2017 was 1 3 NG/ML  His PSA level August 30, 2017 was stable at 1 3 NG/mL   His PSA level from September 9, 2018 was 1 8 NG/mL    His PSA level from September 3, 2019 was 2 3 NG/mL  His most recent PSA level September 4, 2020 has now increased to 3 8 NG/mL  We discussed results today with the patient and recommend that he have imaging done with an Axumin PET-CT study  He previously was comfortable with observation but now with the continued rise in PSA, he would like to pursue imaging and then treatment pending the results of his PET-CT study  We discussed consideration for androgen deprivation therapy  We also discussed the possibility of additional radiation therapy if there is a focal area detected on PET-CT that is outside the previously treated area  We will call him with the results of the PET-CT study and then make treatment recommendations         Meliza Villaseñor MD  9/10/2020,5:54 PM    Portions of the record may have been created with voice recognition software   Occasional wrong word or "sound a like" substitutions may have occurred due to the inherent limitations of voice recognition software   Read the chart carefully and recognize, using context, where substitutions have occurred

## 2020-10-05 ENCOUNTER — HOSPITAL ENCOUNTER (OUTPATIENT)
Dept: RADIOLOGY | Age: 75
Discharge: HOME | End: 2020-10-05
Payer: COMMERCIAL

## 2020-10-05 DIAGNOSIS — C61 PROSTATE CANCER (HCC): ICD-10-CM

## 2020-10-05 DIAGNOSIS — R97.21 RISING PSA FOLLOWING TREATMENT FOR MALIGNANT NEOPLASM OF PROSTATE: ICD-10-CM

## 2020-10-05 PROCEDURE — A9588 FLUCICLOVINE F-18: HCPCS

## 2020-10-05 PROCEDURE — G1004 CDSM NDSC: HCPCS

## 2020-10-05 PROCEDURE — 78815 PET IMAGE W/CT SKULL-THIGH: CPT

## 2020-10-15 ENCOUNTER — CLINICAL SUPPORT (OUTPATIENT)
Dept: RADIATION ONCOLOGY | Facility: CLINIC | Age: 75
End: 2020-10-15
Attending: RADIOLOGY

## 2020-10-15 ENCOUNTER — TELEPHONE (OUTPATIENT)
Dept: SURGICAL ONCOLOGY | Facility: CLINIC | Age: 75
End: 2020-10-15

## 2020-10-15 ENCOUNTER — TELEMEDICINE (OUTPATIENT)
Dept: RADIATION ONCOLOGY | Facility: CLINIC | Age: 75
End: 2020-10-15
Attending: RADIOLOGY

## 2020-10-15 VITALS — BODY MASS INDEX: 32.58 KG/M2 | WEIGHT: 220 LBS | HEIGHT: 69 IN

## 2020-10-15 DIAGNOSIS — C61 PROSTATE CANCER (HCC): Primary | ICD-10-CM

## 2020-10-15 DIAGNOSIS — R97.21 RISING PSA FOLLOWING TREATMENT FOR MALIGNANT NEOPLASM OF PROSTATE: ICD-10-CM

## 2020-10-19 ENCOUNTER — PATIENT OUTREACH (OUTPATIENT)
Dept: UROLOGY | Facility: AMBULATORY SURGERY CENTER | Age: 75
End: 2020-10-19

## 2020-10-22 ENCOUNTER — HOSPITAL ENCOUNTER (OUTPATIENT)
Dept: MRI IMAGING | Facility: HOSPITAL | Age: 75
Discharge: HOME | End: 2020-10-22
Payer: COMMERCIAL

## 2020-10-22 DIAGNOSIS — R97.21 RISING PSA FOLLOWING TREATMENT FOR MALIGNANT NEOPLASM OF PROSTATE: ICD-10-CM

## 2020-10-22 DIAGNOSIS — C61 PROSTATE CANCER (HCC): ICD-10-CM

## 2020-10-22 PROCEDURE — A9585 GADOBUTROL INJECTION: HCPCS | Performed by: RADIOLOGY

## 2020-10-22 PROCEDURE — 70553 MRI BRAIN STEM W/O & W/DYE: CPT

## 2020-10-22 PROCEDURE — G1004 CDSM NDSC: HCPCS

## 2020-10-22 RX ADMIN — GADOBUTROL 9 ML: 604.72 INJECTION INTRAVENOUS at 11:54

## 2020-11-09 ENCOUNTER — CONSULT (OUTPATIENT)
Dept: HEMATOLOGY ONCOLOGY | Facility: CLINIC | Age: 75
End: 2020-11-09
Attending: RADIOLOGY
Payer: COMMERCIAL

## 2020-11-09 VITALS
HEIGHT: 69 IN | RESPIRATION RATE: 18 BRPM | BODY MASS INDEX: 33.62 KG/M2 | WEIGHT: 227 LBS | SYSTOLIC BLOOD PRESSURE: 118 MMHG | DIASTOLIC BLOOD PRESSURE: 80 MMHG | HEART RATE: 80 BPM | TEMPERATURE: 96.2 F | OXYGEN SATURATION: 98 %

## 2020-11-09 DIAGNOSIS — C61 PROSTATE CANCER (HCC): Primary | ICD-10-CM

## 2020-11-09 DIAGNOSIS — R97.21 RISING PSA FOLLOWING TREATMENT FOR MALIGNANT NEOPLASM OF PROSTATE: ICD-10-CM

## 2020-11-09 PROCEDURE — 99204 OFFICE O/P NEW MOD 45 MIN: CPT | Performed by: INTERNAL MEDICINE

## 2020-11-09 RX ORDER — BICALUTAMIDE 50 MG/1
50 TABLET, FILM COATED ORAL DAILY
Qty: 30 TABLET | Refills: 0 | Status: SHIPPED | OUTPATIENT
Start: 2020-11-09

## 2020-11-13 ENCOUNTER — HOSPITAL ENCOUNTER (OUTPATIENT)
Dept: INFUSION CENTER | Facility: HOSPITAL | Age: 75
Discharge: HOME | End: 2020-11-13
Attending: INTERNAL MEDICINE
Payer: COMMERCIAL

## 2020-11-13 VITALS
OXYGEN SATURATION: 96 % | TEMPERATURE: 96.5 F | RESPIRATION RATE: 18 BRPM | DIASTOLIC BLOOD PRESSURE: 88 MMHG | HEART RATE: 67 BPM | SYSTOLIC BLOOD PRESSURE: 140 MMHG

## 2020-11-13 DIAGNOSIS — R97.21 RISING PSA FOLLOWING TREATMENT FOR MALIGNANT NEOPLASM OF PROSTATE: ICD-10-CM

## 2020-11-13 DIAGNOSIS — C61 PROSTATE CANCER (HCC): Primary | ICD-10-CM

## 2020-11-13 PROCEDURE — 96402 CHEMO HORMON ANTINEOPL SQ/IM: CPT

## 2020-11-13 RX ORDER — ZINC GLUCONATE 50 MG
50 TABLET ORAL DAILY
COMMUNITY

## 2020-11-13 RX ADMIN — LEUPROLIDE ACETATE 45 MG: KIT SUBCUTANEOUS at 12:25

## 2021-01-28 ENCOUNTER — IMMUNIZATIONS (OUTPATIENT)
Dept: FAMILY MEDICINE CLINIC | Facility: HOSPITAL | Age: 76
End: 2021-01-28

## 2021-01-28 DIAGNOSIS — Z23 ENCOUNTER FOR IMMUNIZATION: Primary | ICD-10-CM

## 2021-01-28 PROCEDURE — 0011A SARS-COV-2 / COVID-19 MRNA VACCINE (MODERNA) 100 MCG: CPT

## 2021-01-28 PROCEDURE — 91301 SARS-COV-2 / COVID-19 MRNA VACCINE (MODERNA) 100 MCG: CPT

## 2021-02-11 ENCOUNTER — TELEPHONE (OUTPATIENT)
Dept: HEMATOLOGY ONCOLOGY | Facility: CLINIC | Age: 76
End: 2021-02-11

## 2021-02-11 NOTE — TELEPHONE ENCOUNTER
If patient does call back the 2:40 slot is now taken  However we have earlier appointments 9:40am or 10:40am  If either of these work for patient let me know and I will put him on Dr Chan's schedule accordingly

## 2021-02-11 NOTE — TELEPHONE ENCOUNTER
Called patient in regards to his appointment tomorrow afternoon with Dr Chan at 3:20  We have an opening at 2:40 so I was calling to see if patient would like to come in sooner  Stated that if patient would like to come in at 2:30 to please give the office a call back  If this does not work for patient we can keep his original scheduled 3:20pm appointment

## 2021-02-15 ENCOUNTER — TELEPHONE (OUTPATIENT)
Dept: HEMATOLOGY ONCOLOGY | Facility: CLINIC | Age: 76
End: 2021-02-15

## 2021-02-15 NOTE — TELEPHONE ENCOUNTER
Scheduling Appointment     Who Is Calling to Skylar Rodriguez   Doctor Dr Marga Corley   Date and Time 02/22 at 11:00am         Patient verbalized understanding    yes

## 2021-02-19 ENCOUNTER — TELEPHONE (OUTPATIENT)
Dept: HEMATOLOGY ONCOLOGY | Facility: CLINIC | Age: 76
End: 2021-02-19

## 2021-02-19 NOTE — TELEPHONE ENCOUNTER
Appointment Cancellation Or Reschedule     Person calling in Patient  wife    Provider Dr Makayla Shields    Office Visit Date and Time 3/11 2:00 pm    Office Visit Location [place]   Did patient reschedule their appointment? Yes    Is this patient a treatment patient? Yes    When is their next infusion visit? 5/14   Reason for Cancellation or Reschedule Weather      If the patient is a treatment patient, please route this to the office nurse  If the patient is not on treatment, please route to the office MA

## 2021-02-25 ENCOUNTER — IMMUNIZATIONS (OUTPATIENT)
Dept: FAMILY MEDICINE CLINIC | Facility: HOSPITAL | Age: 76
End: 2021-02-25

## 2021-02-25 DIAGNOSIS — Z23 ENCOUNTER FOR IMMUNIZATION: Primary | ICD-10-CM

## 2021-02-25 PROCEDURE — 91301 SARS-COV-2 / COVID-19 MRNA VACCINE (MODERNA) 100 MCG: CPT

## 2021-02-25 PROCEDURE — 0012A SARS-COV-2 / COVID-19 MRNA VACCINE (MODERNA) 100 MCG: CPT

## 2021-03-11 ENCOUNTER — OFFICE VISIT (OUTPATIENT)
Dept: HEMATOLOGY ONCOLOGY | Facility: CLINIC | Age: 76
End: 2021-03-11
Payer: COMMERCIAL

## 2021-03-11 VITALS
BODY MASS INDEX: 34.21 KG/M2 | RESPIRATION RATE: 18 BRPM | HEART RATE: 72 BPM | DIASTOLIC BLOOD PRESSURE: 92 MMHG | SYSTOLIC BLOOD PRESSURE: 142 MMHG | WEIGHT: 231 LBS | HEIGHT: 69 IN | OXYGEN SATURATION: 98 % | TEMPERATURE: 98.5 F

## 2021-03-11 DIAGNOSIS — C61 PROSTATE CANCER (HCC): Primary | ICD-10-CM

## 2021-03-11 PROCEDURE — 99214 OFFICE O/P EST MOD 30 MIN: CPT | Performed by: INTERNAL MEDICINE

## 2021-03-11 PROCEDURE — 1160F RVW MEDS BY RX/DR IN RCRD: CPT | Performed by: INTERNAL MEDICINE

## 2021-03-11 NOTE — PROGRESS NOTES
Hematology / Oncology Outpatient Follow Up Note    Candace Fishman 76 y o  male Amira Valentin YBW:[id number]         Date:  3/11/2021    Assessment / Plan:  A 42-year-old gentleman with history of T2 N0 prostate cancer, [place] score 7 in 2009  He underwent radiation therapy  He never had androgen deprivation therapy  His PSA has been slowly rising since 2018  Based on the imaging study, he has recurrent disease in his prostate as well as right inguinal lymph node  He has no bone metastasis  Since November 2020, he has been on androgen deprivation therapy with Eligard  According to the PSA, he has biochemical response  We are going to obtain the PSA results from January 2021  He is tolerating treatment well  I recommended him to continue with Eligard 45 mg every 6 months  I will see him again in November 2021 when he is due for the Eligard with PSA             Subjective:      HPI:  A 42-year-old gentleman who was initially diagnosed with T2 N0 prostate cancer in 2009, [place] score 7  He underwent radiation therapy  In the last 2-3 years, his PSA has been slowly rising  It was 1 8 in September 2018 which kenisha to 2 3 in September 2019  Most recently in September 2020, PSA was 3 8  He underwent PET-CT scan which showed FDG uptake at the prostate gland with SUV 4 1  He had inguinal lymph node with SUV 3 1, suspicious for metastatic disease  He has no bone metastasis  He presents today with his daughter to discuss systemic therapy  He feels well in usual state of health  He has no complaint of pain  His weight is stable  He has nocturia only once per night  He has no hematuria or dysuria  He has no major past medical history except well-controlled hypertension  He is a remote ex-smoker  His performance status is normal              Interval History:  A 42-year-old gentleman with history of T2 N0 prostate cancer, Berna score 7 in 2009  He underwent radiation therapy   His PSA has been slowly rising since 2018  Based on the imaging study, he has recurrent disease in his prostate as well as right inguinal lymph node  He has no bone metastasis  Since November 2020, he started on androgen deprivation therapy with Eligard  He has noticed some swelling  As well as some had thinning  He presents today for follow-up  According to him, he has PSA in January 2021 was less than 1 which we do not have laboratory result  He feels well  He has no complaint of pain  He does not have nocturia  He denied any respiratory symptoms  His weight is stable  His performance status is normal         Objective:      Primary Diagnosis:     1  T2 N0 M0 Berna score 7 prostate cancer, diagnosed in March 2009       2  Metastatic prostate cancer at his prostate bed as well as inguinal lymph nodes  Diagnosed in October 2020       Cancer Staging:  Cancer Staging  No matching staging information was found for the patient         Previous Hematologic/ Oncologic Treatment:            Current Hematologic/ Oncologic Treatment:       Lupron 45 mg q 6 months  Since November 2020          Disease Status:         Biochemical response      Test Results:     Pathology:           Radiology:           Laboratory:     PSA in September 2020 was 3 8  PSA was 2 3 in September 2019  PSA was 1 8 in September 2018       Physical Exam:        General Appearance:    Alert, oriented          Eyes:    PERRL   Ears:    Normal external ear canals, both ears   Nose:   Nares normal, septum midline   Throat:   Mucosa moist  Pharynx without injection  Neck:   Supple         Lungs:     Clear to auscultation bilaterally   Chest Wall:    No tenderness or deformity    Heart:    Regular rate and rhythm         Abdomen:     Soft, non-tender, bowel sounds +, no organomegaly               Extremities:   Extremities no cyanosis or edema         Skin:   no rash or icterus      Lymph nodes:   Cervical, supraclavicular, and axillary nodes normal   Neurologic: CNII-XII intact, normal strength, sensation and reflexes     Throughout             Breast exam:   NA           ROS: Review of Systems   All other systems reviewed and are negative  Imaging: No results found  Labs: No results found for: WBC, HGB, HCT, MCV, PLT  No results found for: NA, K, CL, CO2, ANIONGAP, BUN, CREATININE, GLUCOSE, GLUF, CALCIUM, CORRECTEDCA, AST, ALT, ALKPHOS, PROT, BILITOT, EGFR        Lab Results   Component Value Date    PSA 3 8 09/04/2020    PSA 2 3 09/03/2019    PSA 1 8 09/07/2018         Current Medications: Reviewed  Allergies: Reviewed  PMH/FH/SH:  Reviewed      Vital Sign:    Body surface area is 2 2 meters squared      Wt Readings from Last 3 Encounters:   03/11/21 105 kg (231 lb)   11/09/20 103 kg (227 lb)   10/15/20 99 8 kg (220 lb)        Temp Readings from Last 3 Encounters:   03/11/21 98 5 °F (36 9 °C) (Tympanic)   11/13/20 (!) 96 5 °F (35 8 °C) (Tympanic)   11/09/20 (!) 96 2 °F (35 7 °C) (Tympanic Core)        BP Readings from Last 3 Encounters:   03/11/21 142/92   11/13/20 140/88   11/09/20 118/80         Pulse Readings from Last 3 Encounters:   03/11/21 72   11/13/20 67   11/09/20 80     @LASTSAO2(3)@

## 2021-04-14 ENCOUNTER — CLINICAL SUPPORT (OUTPATIENT)
Dept: RADIATION ONCOLOGY | Facility: CLINIC | Age: 76
End: 2021-04-14
Attending: RADIOLOGY
Payer: COMMERCIAL

## 2021-04-14 VITALS
HEIGHT: 69 IN | SYSTOLIC BLOOD PRESSURE: 133 MMHG | OXYGEN SATURATION: 96 % | RESPIRATION RATE: 18 BRPM | BODY MASS INDEX: 34.78 KG/M2 | WEIGHT: 234.79 LBS | HEART RATE: 72 BPM | DIASTOLIC BLOOD PRESSURE: 79 MMHG | TEMPERATURE: 96.5 F

## 2021-04-14 DIAGNOSIS — C61 PROSTATE CANCER (HCC): Primary | ICD-10-CM

## 2021-04-14 PROCEDURE — 99211 OFF/OP EST MAY X REQ PHY/QHP: CPT | Performed by: RADIOLOGY

## 2021-04-14 PROCEDURE — G0463 HOSPITAL OUTPT CLINIC VISIT: HCPCS | Performed by: RADIOLOGY

## 2021-04-14 RX ORDER — MULTIVIT WITH MINERALS/LUTEIN
250 TABLET ORAL DAILY
COMMUNITY

## 2021-04-14 NOTE — PROGRESS NOTES
Anabelle Tirado [birth year] is a 76 y o  male who is now 11 5 years post the completion of definitive radiation therapy for [place] score 6 stage II prostate adenocarcinoma  He completed treatment on April 1, 2009 with [facility]Therapy at [facility]   His pretreatment PSA was 4 2 NG/ML  His PSA level September 4, 2020 had increased to 3 8 NG/mL  He had a PET scan 10/5/20 which was consistent with recurrent disease in the prostate and a small left inguinal lymph node that is suspicious  There was a small focus of tracer uptake at the level of the pituitary that could possibly be an incidental pituitary adenoma  MRI with pituitary protocol was recommended  He was last seen 10/15/20 at which time he was referred to medical oncology  PSA's  1/18/21  0 12   9/4/20   3 8  9/3/19   2 3  9/7/18   1 8    10/22/2020 MRI brain- 1  Slightly prominent and rounded appearance of posterior pituitary gland with relatively similar signal intensity and enhancement compared to the remaining gland  Although increased uptake within normal pituitary gland in [facility] PET/CT  has been   reported in the literature recommend 4-6 months follow-up brain MRI (pituitary protocol with dynamic imaging) for reassessment  2   Chronic microangiopathic changes  11/9/20 Dr Tayler Azar- considering his age, lack of symptomatology as well as 11 years of time from diagnosis until recurrent disease, he has good prognosis, although this is stage IV, incurable disease  Discussed options including observation with PSA, ADT alone, ADT with abiraterone with low-dose prednisone  We decided to go with ADT alone  Casodex 50 mg daily for 30 days to start on the same day as 1st Lupron shot  F/u in 3 months with PSA    3/11/21 Dr Tayler Azar- Since November 2020, he has been on androgen deprivation therapy with Eligard  According to him, he has PSA in January 2021 was less than 1 which we do not have laboratory result   According to the PSA, he has biochemical response  We are going to obtain the PSA results  He is tolerating treatment well  I recommended him to continue with Eligard 45 mg every 6 months  I will see him again in November 2021 when he is due for the [facility] [address] with PSA      5/14/21 Lupron injection  11/4/21 Dr Thuy Flaherty    Follow up visit       Oncology History   Prostate cancer [facility])   11/25/2008 Initial Diagnosis    Prostate cancer ([facility] [address] )  - pretreatment PSA was 4 2 NG/ML       11/25/2008 Biopsy    Prostate biopsy: adenocarcinoma, primarily [place] score 3+3=6 with one focus within the left lateral mid gland with [place] score 3+4=7     - Dr Cuca De León     11/25/2008 -  Cancer Staged    Stage II, T2 N0 M0, [place] 7     2/2/2009 - 4/1/2009 Radiation    TomoTherapy - site: prostate   Dose 7560 cGy at [facility]   - Dr Trina Chacon     11/13/2020 -  Hormone Therapy    30 days of Casodex, Lupron injection         Clinical Trial: no      Health Maintenance   Topic Date Due    Hepatitis C Screening  Never done    Medicare Annual Wellness Visit (AWV)  Never done    BMI: Followup Plan  Never done    DTaP,Tdap,and Td Vaccines (1 - Tdap) Never done    Colorectal Cancer Screening  Never done    Pneumococcal Vaccine: 65+ Years (1 of 1 - PPSV23) Never done    PT PLAN OF CARE  02/14/2019    Fall Risk  01/15/2020    Influenza Vaccine (1) 09/01/2020    Depression Screening PHQ  10/15/2021    BMI: Adult  03/11/2022    COVID-19 Vaccine  Completed    HIB Vaccine  Aged Out    Hepatitis B Vaccine  Aged Out    IPV Vaccine  Aged Out    Hepatitis A Vaccine  Aged Out    Meningococcal ACWY Vaccine  Aged Out    HPV Vaccine  Aged Out       Patient Active Problem List   Diagnosis    [facility]    Rising PSA following treatment for malignant neoplasm of prostate     Past Medical History:   Diagnosis Date    Cancer [facility])     Prostate Cancer    Lyme disease     [facility]     Past Surgical History:   Procedure Laterality Date    COLONOSCOPY      PROSTATE BIOPSY  2008    TONSILLECTOMY AND ADENOIDECTOMY      age 25     Family History   Problem Relation Age of Onset    Rectal cancer Father     Colon cancer Paternal Grandfather      Social History     Socioeconomic History    Marital status: Single     Spouse name: Not on file    Number of children: Not on file    Years of education: Not on file    Highest education level: Not on file   Occupational History    Not on file   Social Needs    Financial resource strain: Not on file    Food insecurity     Worry: Not on file     Inability: Not on file    Transportation needs     Medical: Not on file     Non-medical: Not on file   Tobacco Use    Smoking status: Former Smoker     Quit date:      Years since quittin 3    Smokeless tobacco: Never Used   Substance and Sexual Activity    Alcohol use:  Yes     Alcohol/week: 8 0 standard drinks     Types: 8 Standard drinks or equivalent per week    Drug use: No    Sexual activity: Not on file   Lifestyle    Physical activity     Days per week: Not on file     Minutes per session: Not on file    Stress: Not on file   Relationships    Social connections     Talks on phone: Not on file     Gets together: Not on file     Attends [employer] service: Not on file     Active member of club or organization: Not on file     Attends meetings of clubs or organizations: Not on file     Relationship status: Not on file    Intimate partner violence     Fear of current or ex partner: Not on file     Emotionally abused: Not on file     Physically abused: Not on file     Forced sexual activity: Not on file   Other Topics Concern    Not on file   Social History Narrative    Not on file       Current Outpatient Medications:     ascorbic acid (VITAMIN C) 250 mg tablet, Take 250 mg by mouth daily, Disp: , Rfl:     aspirin 81 mg chewable tablet, Chew 1 tablet Daily, Disp: , Rfl:     Cholecalciferol 2000 units CAPS, Take 1 capsule by mouth daily, Disp: , Rfl:     lisinopril (ZESTRIL) 20 mg tablet, Take 20 mg by mouth daily Takes 1/2 tab, Disp: , Rfl:     rosuvastatin (CRESTOR) 20 MG tablet, Take 1 tablet by mouth daily, Disp: , Rfl:     zinc gluconate 50 mg tablet, Take 50 mg by mouth daily, Disp: , Rfl:     bicalutamide (CASODEX) 50 mg tablet, Take 1 tablet (50 mg total) by mouth daily (Patient not taking: Reported on 4/14/2021), Disp: 30 tablet, Rfl: 0    DAILY MULTIPLE VITAMINS PO, Take 1 tablet by mouth daily, Disp: , Rfl:     glucosamine-chondroitin 500-400 MG tablet, Take 1 tablet by mouth daily, Disp: , Rfl:     Methylprednisolone 4 MG TBPK, Use as directed on package (Patient not taking: Reported on 11/13/2020), Disp: 21 tablet, Rfl: 0    OXYCODONE HCL PO, Take by mouth Prn after knee surgery, unsure of dose, Disp: , Rfl:   Allergies   Allergen Reactions    Erythromycin Base Nausea Only    Penicillins Hives       Review of Systems:  Review of Systems   Constitutional: Positive for fatigue  HENT: Negative  Eyes: Negative  Respiratory: Negative  Cardiovascular: Negative  Gastrointestinal: Negative  Endocrine: Negative  Genitourinary: Negative  Negative for dysuria  Nocturia x 1, reports stream is fair   Musculoskeletal: Negative  Skin: Negative  Allergic/Immunologic: Negative  Neurological: Negative  Hematological: Negative  Psychiatric/Behavioral: Negative  Vitals:    04/14/21 1302   BP: 133/79   Pulse: 72   Resp: 18   Temp: (!) 96 5 °F (35 8 °C)   SpO2: 96%   Weight: 107 kg (234 lb 12 6 oz)   Height: 5' 9" (1 753 m)        Pain Score: 0-No pain      Imaging:No results found

## 2021-04-14 NOTE — PROGRESS NOTES
Follow-up - Radiation Oncology   Morenita Durant [birth year] 76 y o  male [id number]      History of Present Illness     Morenita Durant is a 76y o  year old male who is now 12 0 years post the completion of definitive radiation therapy for [place] score 6 stage II prostate adenocarcinoma   He completed treatment on April 1, 2009 with [facility] pretreatment PSA was 4 2 NG/ML  His PSA level September 4, 2020 had increased to 3 8 NG/mL   He had a PET scan 10/5/20 which was consistent with recurrent disease in the prostate and a small left inguinal lymph node that is suspicious  Concepcion Lucioalena was a small focus of tracer uptake at the level of the pituitary that could possibly be an incidental pituitary adenoma   MRI with pituitary protocol was recommended  He was last seen 10/15/20 at which time he was referred to medical oncology  Interval History:   PSA's  1/18/21  0 12 with Dr Steffen Quezada  9/4/20   3 8  9/3/19   2 3  9/7/18   1 8     10/22/2020 MRI brain- 1   Slightly prominent and rounded appearance of posterior pituitary gland with relatively similar signal intensity and enhancement compared to the remaining gland   Although increased uptake within normal pituitary gland in [facility] PET/CT  has been   reported in the literature recommend 4-6 months follow-up brain MRI (pituitary protocol with dynamic imaging) for reassessment  2   Chronic microangiopathic changes      11/9/20 Dr Yaquelin Luong- considering his age, lack of symptomatology as well as 11 years of time from diagnosis until recurrent disease, he has good prognosis, although this is stage IV, incurable disease  Discussed options including observation with PSA, ADT alone, ADT with abiraterone with low-dose prednisone  We decided to go with ADT alone  Casodex 50 mg daily for 30 days to start on the same day as 1st Lupron shot   F/u in 3 months with PSA     3/11/21 Dr Yaquelin Luong- Since November 2020, he has been on androgen deprivation therapy with Eligard  According to him, he has PSA in January 2021 was less than 1 which we do not have laboratory result  According to the PSA, he has biochemical response   We are going to obtain the PSA results  He is tolerating treatment well   I recommended him to continue with Eligard 45 mg every 6 months   I will see him again in November 2021 when he is due for the Eligard with PSA      He returns for additional follow-up and he reports he is feeling well  He denies any headaches or neurologic complaints  He has no gastrointestinal nor genitourinary complaints  He has stable nocturia once each night with a fair urinary stream  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  [facility] is eating well and trying to be careful not to gain any weight   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 with the right side greater than left side   He had left knee replacement in January 2020 and then had left knee revision a August 25, 2020 due to not having full range of motion of the knee  He completed physical therapy in the fall and still has limited range of motion the left knee   He is has difficulty with erections and uses Viagra 100 mg tablets for several years  He has had difficulty with erections even with the use of Viagra since he started on androgen deprivation therapy in November 2020 5/14/21 Lupron injection  11/4/21 Dr Roxie Sidhu   Oncology History   Prostate cancer [facility])   11/25/2008 Initial Diagnosis    Prostate cancer ([facility] [address] )  - pretreatment PSA was 4 2 NG/ML       11/25/2008 Biopsy    Prostate biopsy: adenocarcinoma, primarily [place] score 3+3=6 with one focus within the left lateral mid gland with [place] score 3+4=7     - Dr Leonidas Brock     11/25/2008 -  Cancer Staged    Stage II, T2 N0 M0, [place] 7     2/2/2009 - 4/1/2009 Radiation    TomoTherapy - site: prostate   Dose 7560 cGy at [facility]   - Dr Chrissy Felder     11/13/2020 -  Hormone Therapy    30 days of Casodex, Lupron injection         Past Medical History:   Diagnosis Date    Cancer [facility])     Prostate Cancer    Lyme disease     Prostate cancer ([facility] [address] ) 2008     Past Surgical History:   Procedure Laterality Date    COLONOSCOPY      PROSTATE BIOPSY  2008    TONSILLECTOMY AND ADENOIDECTOMY      age 25       Social History   Social History     Substance and Sexual Activity   Alcohol Use Yes    Alcohol/week: 8 0 standard drinks    Types: 8 Standard drinks or equivalent per week     Social History     Substance and Sexual Activity   Drug Use No     Social History     Tobacco Use   Smoking Status Former Smoker    Quit date:     Years since quittin 3   Smokeless Tobacco Never Used         Meds/Allergies     Current Outpatient Medications:     ascorbic acid (VITAMIN C) 250 mg tablet, Take 250 mg by mouth daily, Disp: , Rfl:     aspirin 81 mg chewable tablet, Chew 1 tablet Daily, Disp: , Rfl:     Cholecalciferol 2000 units CAPS, Take 1 capsule by mouth daily, Disp: , Rfl:     lisinopril (ZESTRIL) 20 mg tablet, Take 20 mg by mouth daily Takes 1/2 tab, Disp: , Rfl:     rosuvastatin (CRESTOR) 20 MG tablet, Take 1 tablet by mouth daily, Disp: , Rfl:     zinc gluconate 50 mg tablet, Take 50 mg by mouth daily, Disp: , Rfl:     bicalutamide (CASODEX) 50 mg tablet, Take 1 tablet (50 mg total) by mouth daily (Patient not taking: Reported on 2021), Disp: 30 tablet, Rfl: 0    DAILY MULTIPLE VITAMINS PO, Take 1 tablet by mouth daily, Disp: , Rfl:     glucosamine-chondroitin 500-400 MG tablet, Take 1 tablet by mouth daily, Disp: , Rfl:     Methylprednisolone 4 MG TBPK, Use as directed on package (Patient not taking: Reported on 2020), Disp: 21 tablet, Rfl: 0    OXYCODONE HCL PO, Take by mouth Prn after knee surgery, unsure of dose, Disp: , Rfl:   Allergies   Allergen Reactions    Erythromycin Base Nausea Only    Penicillins Hives     Review of Systems  Constitutional: Positive for fatigue  HENT: Negative  Eyes: Negative  Respiratory: Negative  Cardiovascular: Negative  Gastrointestinal: Negative  Endocrine: Negative  Genitourinary: Negative  Negative for dysuria  Nocturia x 1, reports stream is fair   Musculoskeletal: Negative  Skin: Negative  Allergic/Immunologic: Negative  Neurological: Negative  Hematological: Negative  Psychiatric/Behavioral: Negative        OBJECTIVE:   /79   Pulse 72   Temp (!) 96 5 °F (35 8 °C)   Resp 18   Ht 5' 9" (1 753 m)   Wt 107 kg (234 lb 12 6 oz)   SpO2 96%   BMI 34 67 kg/m²   Pain Assessment:  0  ECOG/Zubrod/WHO: 1 - Symptomatic but completely ambulatory    Physical Exam   Constitutional: He is oriented to person, place, and time  He appears well-developed and well-nourished  No distress  HENT:   Head: Normocephalic and atraumatic  Mouth/Throat: No oropharyngeal exudate  Eyes: Conjunctivae and EOM are normal  Pupils are equal, round, and reactive to light  No scleral icterus  Neck: Normal range of motion  Neck supple  No tracheal deviation present  No thyromegaly present  Cardiovascular: Normal rate, regular rhythm and normal heart sounds     Pulmonary/Chest: Effort normal and breath sounds normal  No respiratory distress  He has no wheezes  He has no rales  He exhibits no tenderness  Abdominal: Soft  Bowel sounds are normal  He exhibits no distension and no mass  There is no tenderness  Genitourinary: Rectum normal and prostate normal    Genitourinary Comments: Prostate is smooth flat and without any induration or nodularity    Musculoskeletal: Normal range of motion  He exhibits no edema or tenderness  Lymphadenopathy:     He has no cervical adenopathy         Right: No inguinal and no supraclavicular adenopathy present         Left: No inguinal and no supraclavicular adenopathy present     Neurological: He is alert and oriented to person, place, and time  No cranial nerve deficit  Coordination normal    Skin: Skin is warm and dry  No rash noted  He is not diaphoretic  No erythema  No pallor  Psychiatric: He has a normal mood and affect  His behavior is normal  Judgment and thought content normal    Nursing note and vitals reviewed  RESULTS    Lab Results: No results found for this or any previous visit (from the past 672 hour(s))  Imaging Studies: See Above    Assessment/Plan:  Orders Placed This Encounter   Procedures    MRI brain w wo contrast    PSA Total, Diagnostic        Jerene Hatchet is a 76y o  year old male who is now 12 0 years post the completion of definitive radiation therapy for [place] score 6 stage II prostate adenocarcinoma   He completed treatment on April 1, 2009 with [facility] pretreatment PSA was 4 2 NG/ML  His PSA level in May 2014 was stable at 0 8 ng/ML  This was repeated in November 2014 and was 0 7 NG/ML  Repeat PSA level November 24, 2015 was 0 9 NG/ML  His PSA level performed February 1, 2017 was 1 3 NG/ML  His PSA level August 30, 2017 was stable at 1 3 NG/mL   His PSA level from September 9, 2018 was 1 8 NG/mL    His PSA level from September 3, 2019 was 2 3 NG/mL   His PSA level September 4, 2020  increased to 3 8 NG/mL   We discussed results on September 10, 2020 with the patient and recommend that he have imaging done with an [facility] PET-CT study     He previously was comfortable with observation but now with the continued rise in PSA, he would like to pursue imaging and then treatment pending the results of his PET-CT study   We discussed consideration for androgen deprivation therapy         He returned  October 15, 2020 after [facility] PET-CT study performed October 5, 2020 which is consistent with recurrent disease in the prostate and a small left inguinal lymph node that is suspicious    There was a small focus of tracer uptake at the level of the pituitary that could possibly be an incidental pituitary adenoma  MRI with pituitary protocol was recommended  in 4-6 months which will be scheduled  There was no evidence of metastasis in the neck, chest, nor abdomen  We discussed that he is not a candidate for any additional radiation therapy to the prostate/pelvic region  He has not seen a urologist for many years  He was referred to Medical Oncology to discuss his systemic treatment  He saw Dr Donnie Perez on November 9, 2020 who discussed options of observation, androgen deprivation therapy alone or androgen deprivation therapy with abiraterone and low-dose prednisone  He opted for androgen deprivation therapy alone which was started in November 2020  He reports a few hot flashes and is otherwise tolerating this well  He will have his next Lupron injection on May 14, 2021  Repeat PSA level came down to 0 12 NG/ mL on January 18, 2021  We will call him with the results of his pituitary MRI  He will return here for follow-up in 6 months with a repeat PSA level  Shantanu Kearns MD  4/65/8582,4:38 PM    Portions of the record may have been created with voice recognition software   Occasional wrong word or "sound a like" substitutions may have occurred due to the inherent limitations of voice recognition software   Read the chart carefully and recognize, using context, where substitutions have occurred

## 2021-04-26 ENCOUNTER — HOSPITAL ENCOUNTER (OUTPATIENT)
Dept: MRI IMAGING | Facility: HOSPITAL | Age: 76
Discharge: HOME | End: 2021-04-26
Payer: COMMERCIAL

## 2021-04-26 DIAGNOSIS — C61 PROSTATE CANCER (HCC): ICD-10-CM

## 2021-04-26 PROCEDURE — 70553 MRI BRAIN STEM W/O & W/DYE: CPT

## 2021-04-26 PROCEDURE — A9585 GADOBUTROL INJECTION: HCPCS | Performed by: RADIOLOGY

## 2021-04-26 PROCEDURE — G1004 CDSM NDSC: HCPCS

## 2021-04-26 RX ADMIN — GADOBUTROL 10 ML: 604.72 INJECTION INTRAVENOUS at 11:21

## 2021-05-14 ENCOUNTER — HOSPITAL ENCOUNTER (OUTPATIENT)
Dept: INFUSION CENTER | Facility: HOSPITAL | Age: 76
Discharge: HOME | End: 2021-05-14
Payer: COMMERCIAL

## 2021-05-14 VITALS
SYSTOLIC BLOOD PRESSURE: 146 MMHG | HEART RATE: 77 BPM | OXYGEN SATURATION: 99 % | RESPIRATION RATE: 16 BRPM | DIASTOLIC BLOOD PRESSURE: 89 MMHG | TEMPERATURE: 95.7 F

## 2021-05-14 DIAGNOSIS — C61 PROSTATE CANCER (HCC): ICD-10-CM

## 2021-05-14 DIAGNOSIS — R97.21 RISING PSA FOLLOWING TREATMENT FOR MALIGNANT NEOPLASM OF PROSTATE: Primary | ICD-10-CM

## 2021-05-14 PROCEDURE — 96402 CHEMO HORMON ANTINEOPL SQ/IM: CPT

## 2021-05-14 RX ADMIN — LEUPROLIDE ACETATE 45 MG: KIT SUBCUTANEOUS at 09:11

## 2021-05-14 NOTE — PLAN OF CARE
Problem: INFECTION - ADULT  Goal: Absence or prevention of progression during hospitalization  Description: INTERVENTIONS:  - Assess and monitor for signs and symptoms of infection  - Monitor lab/diagnostic results  - Monitor all insertion sites, i e  indwelling lines, tubes, and drains  - Monitor endotracheal if appropriate and nasal secretions for changes in amount and color  - [place] appropriate cooling/warming therapies per order  - Administer medications as ordered  - Instruct and encourage patient and family to use good hand hygiene technique  - Identify and instruct in appropriate isolation precautions for identified infection/condition  Outcome: Progressing     Problem: Knowledge Deficit  Goal: Patient/family/caregiver demonstrates understanding of disease process, treatment plan, medications, and discharge instructions  Description: Complete learning assessment and assess knowledge base    Interventions:  - Provide teaching at level of understanding  - Provide teaching via preferred learning methods  Outcome: Progressing

## 2021-11-04 ENCOUNTER — OFFICE VISIT (OUTPATIENT)
Dept: HEMATOLOGY ONCOLOGY | Facility: CLINIC | Age: 76
End: 2021-11-04
Payer: COMMERCIAL

## 2021-11-04 ENCOUNTER — HOSPITAL ENCOUNTER (OUTPATIENT)
Dept: INFUSION CENTER | Facility: CLINIC | Age: 76
Discharge: HOME | End: 2021-11-04
Payer: COMMERCIAL

## 2021-11-04 VITALS
SYSTOLIC BLOOD PRESSURE: 124 MMHG | DIASTOLIC BLOOD PRESSURE: 79 MMHG | HEART RATE: 70 BPM | TEMPERATURE: 97.7 F | RESPIRATION RATE: 18 BRPM

## 2021-11-04 VITALS
OXYGEN SATURATION: 97 % | HEIGHT: 69 IN | HEART RATE: 68 BPM | RESPIRATION RATE: 18 BRPM | SYSTOLIC BLOOD PRESSURE: 124 MMHG | TEMPERATURE: 95.9 F | DIASTOLIC BLOOD PRESSURE: 78 MMHG | WEIGHT: 230.1 LBS | BODY MASS INDEX: 34.08 KG/M2

## 2021-11-04 DIAGNOSIS — C61 PROSTATE CANCER (HCC): Primary | ICD-10-CM

## 2021-11-04 DIAGNOSIS — R97.21 RISING PSA FOLLOWING TREATMENT FOR MALIGNANT NEOPLASM OF PROSTATE: ICD-10-CM

## 2021-11-04 PROCEDURE — 99214 OFFICE O/P EST MOD 30 MIN: CPT | Performed by: INTERNAL MEDICINE

## 2021-11-04 PROCEDURE — 96402 CHEMO HORMON ANTINEOPL SQ/IM: CPT

## 2021-11-04 RX ADMIN — LEUPROLIDE ACETATE 45 MG: KIT at 11:07

## 2021-11-16 ENCOUNTER — APPOINTMENT (OUTPATIENT)
Dept: RADIATION ONCOLOGY | Facility: CLINIC | Age: 76
End: 2021-11-16
Payer: COMMERCIAL

## 2021-11-17 ENCOUNTER — RADIATION ONCOLOGY FOLLOW-UP (OUTPATIENT)
Dept: RADIATION ONCOLOGY | Facility: CLINIC | Age: 76
End: 2021-11-17
Attending: RADIOLOGY
Payer: COMMERCIAL

## 2021-11-17 VITALS
HEIGHT: 69 IN | BODY MASS INDEX: 34.07 KG/M2 | DIASTOLIC BLOOD PRESSURE: 77 MMHG | HEART RATE: 76 BPM | SYSTOLIC BLOOD PRESSURE: 111 MMHG | TEMPERATURE: 97.3 F | RESPIRATION RATE: 16 BRPM | WEIGHT: 230 LBS

## 2021-11-17 DIAGNOSIS — C61 PROSTATE CANCER (HCC): Primary | ICD-10-CM

## 2021-11-17 PROCEDURE — 99211 OFF/OP EST MAY X REQ PHY/QHP: CPT | Performed by: RADIOLOGY

## 2021-11-17 PROCEDURE — G0463 HOSPITAL OUTPT CLINIC VISIT: HCPCS | Performed by: RADIOLOGY

## 2021-11-17 PROCEDURE — 99213 OFFICE O/P EST LOW 20 MIN: CPT | Performed by: RADIOLOGY

## 2022-03-09 ENCOUNTER — HOSPITAL ENCOUNTER (EMERGENCY)
Facility: HOSPITAL | Age: 77
Discharge: HOME | End: 2022-03-09
Attending: EMERGENCY MEDICINE | Admitting: EMERGENCY MEDICINE
Payer: COMMERCIAL

## 2022-03-09 VITALS
OXYGEN SATURATION: 98 % | HEIGHT: 69 IN | DIASTOLIC BLOOD PRESSURE: 85 MMHG | HEART RATE: 68 BPM | RESPIRATION RATE: 20 BRPM | BODY MASS INDEX: 32.58 KG/M2 | WEIGHT: 220 LBS | TEMPERATURE: 98.1 F | SYSTOLIC BLOOD PRESSURE: 155 MMHG

## 2022-03-09 DIAGNOSIS — S61.412A LACERATION OF LEFT HAND: Primary | ICD-10-CM

## 2022-03-09 PROCEDURE — 90471 IMMUNIZATION ADMIN: CPT

## 2022-03-09 PROCEDURE — 90715 TDAP VACCINE 7 YRS/> IM: CPT | Performed by: EMERGENCY MEDICINE

## 2022-03-09 PROCEDURE — 99282 EMERGENCY DEPT VISIT SF MDM: CPT | Performed by: EMERGENCY MEDICINE

## 2022-03-09 PROCEDURE — 99283 EMERGENCY DEPT VISIT LOW MDM: CPT

## 2022-03-09 PROCEDURE — 12001 RPR S/N/AX/GEN/TRNK 2.5CM/<: CPT | Performed by: EMERGENCY MEDICINE

## 2022-03-09 RX ORDER — GINSENG 100 MG
1 CAPSULE ORAL ONCE
Status: COMPLETED | OUTPATIENT
Start: 2022-03-09 | End: 2022-03-09

## 2022-03-09 RX ADMIN — TETANUS TOXOID, REDUCED DIPHTHERIA TOXOID AND ACELLULAR PERTUSSIS VACCINE, ADSORBED 0.5 ML: 5; 2.5; 8; 8; 2.5 SUSPENSION INTRAMUSCULAR at 11:50

## 2022-03-09 RX ADMIN — BACITRACIN 1 SMALL APPLICATION: 500 OINTMENT TOPICAL at 11:51

## 2022-03-09 NOTE — ED NOTES
Patient discharged to home  Verbalized understanding of discharge instructions and need for follow up care       Radha Drake RN  03/09/22 [id number]
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
## 2022-04-22 ENCOUNTER — APPOINTMENT (OUTPATIENT)
Dept: LAB | Facility: HOSPITAL | Age: 77
End: 2022-04-22
Payer: COMMERCIAL

## 2022-04-22 DIAGNOSIS — C61 PROSTATE CANCER (HCC): ICD-10-CM

## 2022-04-22 LAB — PSA SERPL-MCNC: 0.4 NG/ML (ref 0–4)

## 2022-04-22 PROCEDURE — 84153 ASSAY OF PSA TOTAL: CPT

## 2022-05-05 ENCOUNTER — OFFICE VISIT (OUTPATIENT)
Dept: HEMATOLOGY ONCOLOGY | Facility: CLINIC | Age: 77
End: 2022-05-05
Payer: COMMERCIAL

## 2022-05-05 ENCOUNTER — HOSPITAL ENCOUNTER (OUTPATIENT)
Dept: INFUSION CENTER | Facility: CLINIC | Age: 77
Discharge: HOME | End: 2022-05-05
Payer: COMMERCIAL

## 2022-05-05 VITALS
OXYGEN SATURATION: 95 % | HEART RATE: 61 BPM | WEIGHT: 225 LBS | TEMPERATURE: 96.5 F | SYSTOLIC BLOOD PRESSURE: 112 MMHG | BODY MASS INDEX: 33.33 KG/M2 | DIASTOLIC BLOOD PRESSURE: 64 MMHG | HEIGHT: 69 IN | RESPIRATION RATE: 18 BRPM

## 2022-05-05 VITALS
TEMPERATURE: 96.9 F | DIASTOLIC BLOOD PRESSURE: 89 MMHG | HEART RATE: 64 BPM | RESPIRATION RATE: 18 BRPM | SYSTOLIC BLOOD PRESSURE: 135 MMHG

## 2022-05-05 DIAGNOSIS — R97.21 RISING PSA FOLLOWING TREATMENT FOR MALIGNANT NEOPLASM OF PROSTATE: ICD-10-CM

## 2022-05-05 DIAGNOSIS — C61 PROSTATE CANCER (HCC): Primary | ICD-10-CM

## 2022-05-05 PROCEDURE — 96402 CHEMO HORMON ANTINEOPL SQ/IM: CPT

## 2022-05-05 PROCEDURE — 99214 OFFICE O/P EST MOD 30 MIN: CPT | Performed by: INTERNAL MEDICINE

## 2022-05-05 RX ADMIN — LEUPROLIDE ACETATE 45 MG: KIT SUBCUTANEOUS at 09:42

## 2022-05-05 NOTE — PROGRESS NOTES
Hematology / Oncology Outpatient Follow Up Note    Aby Flannery 68 y o  male Jose M Le SXD:[id number]         Date:  5/5/2022    Assessment / Plan:  A 66-year-old gentleman with history of T2 N0 prostate cancer, [place] score 7 in 2009  He underwent radiation therapy  His PSA has been slowly rising since 2018  Based on the imaging study, he has recurrent disease in his prostate as well as right inguinal lymph node   He has no bone metastasis  Since November 2020, he has been on androgen deprivation therapy with Eligard , resulting in good biochemical response  Clinically, he has stable with no tumor related symptoms  His PSA remains suppressed  Therefore, I recommended him to stay on Eligard every 6 months  I will see him again in 6 months with another PSA he is in agreement with my recommendations         Subjective:      HPI:  A 66-year-old gentleman who was initially diagnosed with T2 N0 prostate cancer in 2009, Berna score 7  He underwent radiation therapy   In the last 2-3 years, his PSA has been slowly rising   It was 1 8 in September 2018 which kenisha to 2 3 in September 2019  Most recently in September 2020, PSA was 3 8  Raine Tavera underwent PET-CT scan which showed FDG uptake at the prostate gland with SUV 4 1   He had inguinal lymph node with SUV 3 1, suspicious for metastatic disease   He has no bone metastasis   He presents today with his daughter to discuss systemic therapy  Raine Tavera feels well in usual state of health  Raine Tavera has no complaint of pain   His weight is stable   He has nocturia only once per night  Raine Tavera has no hematuria or dysuria  Raine Tavera has no major past medical history except well-controlled hypertension  Raine Tavera is a remote ex-smoker   His performance status is normal              Interval History:  A 66-year-old gentleman with history of T2 N0 prostate cancer, [place] score 7 in 2009  He underwent radiation therapy  His PSA has been slowly rising since 2018   Based on the imaging study, he has recurrent disease in his prostate as well as right inguinal lymph node   He has no bone metastasis    Since November 2020, he started on androgen deprivation therapy with Eligard , with excellent tolerance  He occasionally have some hot flashes which is mild  His exercise endurance is about the same  He has no dysuria or hematuria  He normally go to bathroom once during the night  He has no complaint of pain  He has normal performance status         Objective:      Primary Diagnosis:     1  T2 N0 M0 Berna score 7 prostate cancer, diagnosed in March 2009       2  Metastatic prostate cancer at his prostate bed as well as inguinal lymph nodes   Diagnosed in October 2020       Cancer Staging:  Cancer Staging  No matching staging information was found for the patient         Previous Hematologic/ Oncologic Treatment:            Current Hematologic/ Oncologic Treatment:       Lupron 45 mg q 6 months  Since November 2020          Disease Status:         Good biochemical response      Test Results:     Pathology:           Radiology:           Laboratory:     PSA in October 2021 was 0 33  PSA in April 2022 was 0 4      Physical Exam:        General Appearance:    Alert, oriented          Eyes:    PERRL   Ears:    Normal external ear canals, both ears   Nose:   Nares normal, septum midline   Throat:   Mucosa moist  Pharynx without injection  Neck:   Supple         Lungs:     Clear to auscultation bilaterally   Chest Wall:    No tenderness or deformity    Heart:    Regular rate and rhythm         Abdomen:     Soft, non-tender, bowel sounds +, no organomegaly               Extremities:   Extremities no cyanosis or edema         Skin:   no rash or icterus      Lymph nodes:   Cervical, supraclavicular, and axillary nodes normal   Neurologic:   CNII-XII intact, normal strength, sensation and reflexes     Throughout             Breast exam:   NA              ROS: Review of Systems   All other systems reviewed and are negative  Imaging: No results found  Labs: No results found for: WBC, HGB, HCT, MCV, PLT  No results found for: NA, K, CL, CO2, ANIONGAP, BUN, CREATININE, GLUCOSE, GLUF, CALCIUM, CORRECTEDCA, AST, ALT, ALKPHOS, PROT, BILITOT, EGFR        Lab Results   Component Value Date    PSA 0 4 04/22/2022    PSA 3 8 09/04/2020    PSA 2 3 09/03/2019         Current Medications: Reviewed  Allergies: Reviewed  PMH/FH/SH:  Reviewed      Vital Sign:    Body surface area is 2 17 meters squared      Wt Readings from Last 3 Encounters:   05/05/22 102 kg (225 lb)   03/09/22 99 8 kg (220 lb)   11/17/21 104 kg (230 lb)        Temp Readings from Last 3 Encounters:   05/05/22 (!) 96 5 °F (35 8 °C) (Tympanic Core)   05/05/22 (!) 96 9 °F (36 1 °C) (Temporal)   03/09/22 98 1 °F (36 7 °C) (Temporal)        BP Readings from Last 3 Encounters:   05/05/22 112/64   05/05/22 135/89   03/09/22 155/85         Pulse Readings from Last 3 Encounters:   05/05/22 61   05/05/22 64   03/09/22 68     @LASTSAO2(3)@

## 2022-05-05 NOTE — PROGRESS NOTES
Pt given Eligard in the left arm (pt preference) as ordered  Pt declined AVS but did make future appt before leaving infusion center

## 2022-10-31 ENCOUNTER — APPOINTMENT (OUTPATIENT)
Dept: LAB | Facility: HOSPITAL | Age: 77
End: 2022-10-31

## 2022-10-31 DIAGNOSIS — C61 PROSTATE CANCER (HCC): ICD-10-CM

## 2022-10-31 LAB — PSA SERPL-MCNC: 0.4 NG/ML (ref 0–4)

## 2022-11-07 ENCOUNTER — HOSPITAL ENCOUNTER (OUTPATIENT)
Dept: INFUSION CENTER | Facility: CLINIC | Age: 77
Discharge: HOME | End: 2022-11-07

## 2022-11-07 ENCOUNTER — OFFICE VISIT (OUTPATIENT)
Dept: HEMATOLOGY ONCOLOGY | Facility: CLINIC | Age: 77
End: 2022-11-07

## 2022-11-07 VITALS
BODY MASS INDEX: 33.99 KG/M2 | TEMPERATURE: 96.7 F | OXYGEN SATURATION: 95 % | HEART RATE: 61 BPM | DIASTOLIC BLOOD PRESSURE: 74 MMHG | HEIGHT: 69 IN | WEIGHT: 229.5 LBS | RESPIRATION RATE: 18 BRPM | SYSTOLIC BLOOD PRESSURE: 122 MMHG

## 2022-11-07 VITALS
HEART RATE: 61 BPM | TEMPERATURE: 97.9 F | SYSTOLIC BLOOD PRESSURE: 147 MMHG | RESPIRATION RATE: 18 BRPM | DIASTOLIC BLOOD PRESSURE: 85 MMHG

## 2022-11-07 DIAGNOSIS — C61 PROSTATE CANCER (HCC): Primary | ICD-10-CM

## 2022-11-07 DIAGNOSIS — R97.21 RISING PSA FOLLOWING TREATMENT FOR MALIGNANT NEOPLASM OF PROSTATE: ICD-10-CM

## 2022-11-07 RX ADMIN — LEUPROLIDE ACETATE 45 MG: KIT SUBCUTANEOUS at 09:44

## 2022-11-07 NOTE — PROGRESS NOTES
Hematology / Oncology Outpatient Follow Up Note    Meagan Mott 68 y o  male Pavel Salguero MHX:[id number]         Date:  11/7/2022    Assessment / Plan:  A 59-year-old gentleman with history of T2 N0 prostate cancer, Berna score 7 in 2009  He underwent radiation therapy  His PSA has been slowly rising since 2018  Based on the imaging study, he has recurrent disease in his prostate as well as right inguinal lymph node   He has no bone metastasis  Since November 2020, he has been on androgen deprivation therapy with Eligard , resulting in good biochemical response  He has no evidence of progressive disease  I recommended him to continue Eligard every 6 months  Will see him again in 6 months with another PSA  He is in agreement with my recommendation           Subjective:      HPI:  A 68-year-old gentleman who was initially diagnosed with T2 N0 prostate cancer in 2009, Berna score 7  He underwent radiation therapy   In the last 2-3 years, his PSA has been slowly rising   It was 1 8 in September 2018 which kenihsa to 2 3 in September 2019  Most recently in September 2020, PSA was 3 8  Ashley Rosario underwent PET-CT scan which showed FDG uptake at the prostate gland with SUV 4 1   He had inguinal lymph node with SUV 3 1, suspicious for metastatic disease   He has no bone metastasis   He presents today with his daughter to discuss systemic therapy  Ashley Rosario feels well in usual state of health  Ashley Rosario has no complaint of pain   His weight is stable   He has nocturia only once per night  Ashley Rosario has no hematuria or dysuria  Ashley Rosario has no major past medical history except well-controlled hypertension  Ashley Rosario is a remote ex-smoker   His performance status is normal              Interval History:  A 68year-old gentleman with history of T2 N0 prostate cancer, Berna score 7 in 2009  He underwent radiation therapy  His PSA has been slowly rising since 2018   Based on the imaging study, he has recurrent disease in his prostate as well as right inguinal lymph node   He has no bone metastasis    Since November 2020, he started on androgen deprivation therapy with Eligard, with excellent tolerance  He presents today for routine follow-up  He has occasional hot flashes  He denied bone pain  His weight is stable  He has no respiratory symptoms  His PSA remained suppressed  His performance status is normal           Objective:      Primary Diagnosis:     1  T2 N0 M0 Vincent score 7 prostate cancer, diagnosed in March 2009       2  Metastatic prostate cancer at his prostate bed as well as inguinal lymph nodes   Diagnosed in October 2020       Cancer Staging:  Cancer Staging  No matching staging information was found for the patient         Previous Hematologic/ Oncologic Treatment:            Current Hematologic/ Oncologic Treatment:       Lupron 45 mg q 6 months  Since November 2020          Disease Status:         Good biochemical response      Test Results:     Pathology:           Radiology:           Laboratory:     See below for PSA      Physical Exam:        General Appearance:    Alert, oriented          Eyes:    PERRL   Ears:    Normal external ear canals, both ears   Nose:   Nares normal, septum midline   Throat:   Mucosa moist  Pharynx without injection  Neck:   Supple         Lungs:     Clear to auscultation bilaterally   Chest Wall:    No tenderness or deformity    Heart:    Regular rate and rhythm         Abdomen:     Soft, non-tender, bowel sounds +, no organomegaly               Extremities:   Extremities no cyanosis or edema         Skin:   no rash or icterus  Lymph nodes:   Cervical, supraclavicular, and axillary nodes normal   Neurologic:   CNII-XII intact, normal strength, sensation and reflexes     Throughout             Breast exam:   NA           ROS: Review of Systems   All other systems reviewed and are negative  Imaging: No results found        Labs: No results found for: WBC, HGB, HCT, MCV, PLT  No results found for: NA, K, CL, CO2, ANIONGAP, BUN, CREATININE, GLUCOSE, GLUF, CALCIUM, CORRECTEDCA, AST, ALT, ALKPHOS, PROT, BILITOT, EGFR        Lab Results   Component Value Date    PSA 0 4 10/31/2022    PSA 0 4 04/22/2022    PSA 3 8 09/04/2020           Current Medications: Reviewed  Allergies: Reviewed  PMH/FH/SH:  Reviewed      Vital Sign:    Body surface area is 2 19 meters squared      Wt Readings from Last 3 Encounters:   11/07/22 104 kg (229 lb 8 oz)   05/05/22 102 kg (225 lb)   03/09/22 99 8 kg (220 lb)        Temp Readings from Last 3 Encounters:   11/07/22 (!) 96 7 °F (35 9 °C) (Tympanic)   11/07/22 97 9 °F (36 6 °C) (Tympanic)   05/05/22 (!) 96 9 °F (36 1 °C) (Temporal)        BP Readings from Last 3 Encounters:   11/07/22 122/74   11/07/22 147/85   05/05/22 135/89         Pulse Readings from Last 3 Encounters:   11/07/22 61   11/07/22 61   05/05/22 64     @LASTSAO2(3)@

## 2022-11-07 NOTE — PROGRESS NOTES
Pt given Eligard in the left arm (pt preference) as ordered  Pt declined AVS but did make future appt before leaving infusion center  Pt is going to appt with Dr Courtney Armenta presently

## 2023-02-16 ENCOUNTER — HOSPITAL ENCOUNTER (OUTPATIENT)
Dept: RADIOLOGY | Facility: HOSPITAL | Age: 78
End: 2023-02-16

## 2023-02-16 DIAGNOSIS — R05.9 COUGH, UNSPECIFIED TYPE: ICD-10-CM

## 2023-02-25 ENCOUNTER — HOSPITAL ENCOUNTER (OUTPATIENT)
Dept: CT IMAGING | Facility: HOSPITAL | Age: 78
Discharge: HOME | End: 2023-02-25

## 2023-02-25 DIAGNOSIS — R91.8 OTHER NONSPECIFIC ABNORMAL FINDING OF LUNG FIELD: ICD-10-CM

## 2023-02-25 DIAGNOSIS — R05.9 COUGH, UNSPECIFIED TYPE: ICD-10-CM

## 2023-04-27 ENCOUNTER — APPOINTMENT (OUTPATIENT)
Dept: LAB | Facility: HOSPITAL | Age: 78
End: 2023-04-27

## 2023-04-27 DIAGNOSIS — C61 PROSTATE CANCER (HCC): ICD-10-CM

## 2023-04-27 LAB — PSA SERPL-MCNC: 0.7 NG/ML (ref 0–4)

## 2023-05-08 ENCOUNTER — OFFICE VISIT (OUTPATIENT)
Dept: HEMATOLOGY ONCOLOGY | Facility: CLINIC | Age: 78
End: 2023-05-08

## 2023-05-08 ENCOUNTER — HOSPITAL ENCOUNTER (OUTPATIENT)
Dept: INFUSION CENTER | Facility: CLINIC | Age: 78
Discharge: HOME | End: 2023-05-08

## 2023-05-08 VITALS
BODY MASS INDEX: 34.96 KG/M2 | OXYGEN SATURATION: 99 % | HEART RATE: 69 BPM | TEMPERATURE: 97.1 F | DIASTOLIC BLOOD PRESSURE: 80 MMHG | WEIGHT: 236 LBS | HEIGHT: 69 IN | SYSTOLIC BLOOD PRESSURE: 118 MMHG

## 2023-05-08 DIAGNOSIS — C61 PROSTATE CANCER (HCC): Primary | ICD-10-CM

## 2023-05-08 DIAGNOSIS — R97.21 RISING PSA FOLLOWING TREATMENT FOR MALIGNANT NEOPLASM OF PROSTATE: ICD-10-CM

## 2023-05-08 RX ORDER — FLUTICASONE PROPIONATE 110 UG/1
2 AEROSOL, METERED RESPIRATORY (INHALATION) 2 TIMES DAILY
COMMUNITY

## 2023-05-08 NOTE — PROGRESS NOTES
Hematology / Oncology Outpatient Follow Up Note    Christin Cifuentes 68 y o  male Delroy Emery [facility]:[id number]         Date:  5/8/2023    Assessment / Plan:  A 61-year-old gentleman with history of T2 N0 prostate cancer, Berna score 7 in 2009  He underwent radiation therapy  His PSA has been slowly rising since 2018  Based on the imaging study, he has recurrent disease in his prostate as well as right inguinal lymph node   He has no bone metastasis  Since November 2020, he has been on androgen deprivation therapy with Eligard , resulting in good biochemical response  His PSA increased from 0 4 to 0 7 in 6 months  However, he remains asymptomatic  I recommended him to continue ADT with Eligard every 6 months  I recommended him to have bone scan and CT scan of abdomen pelvis as well as PSA in 4 months followed by office visit  Depending on PSA as well as imaging study, he may need additional treatment such as abiraterone or antiandrogen  He is in agreement with my recommendations  Subjective:      HPI:  A 61-year-old gentleman who was initially diagnosed with T2 N0 prostate cancer in 2009, [place] score 7  He underwent radiation therapy   In the last 2-3 years, his PSA has been slowly rising   It was 1 8 in September 2018 which kenisha to 2 3 in September 2019   Most recently in September 2020, PSA was 3 8  [facility] underwent PET-CT scan which showed FDG uptake at the prostate gland with SUV 4 1   He had inguinal lymph node with SUV 3 1, suspicious for metastatic disease   He has no bone metastasis   He presents today with his daughter to discuss systemic therapy  [facility] feels well in usual state of health  [facility] has no complaint of pain   His weight is stable   He has nocturia only once per night  [facility] has no hematuria or dysuria   He has no major past medical history except well-controlled hypertension  [facility] is a remote ex-smoker   His performance status is normal              Interval History:  A 68year-old gentleman with history of T2 N0 prostate cancer, [place] score 7 in 2009  He underwent radiation therapy  His PSA has been slowly rising since 2018  Based on the imaging study, he has recurrent disease in his prostate as well as right inguinal lymph node   He has no bone metastasis    Since November 2020, he started on androgen deprivation therapy with Eligard, with excellent tolerance, resulting in good biochemical response  He presents today for routine follow-up  His PSA went up to 0 7 from 0 4 in 6 months  He has no new symptoms  He denied any pain  His weight is stable  He has no respiratory symptoms  He has slightly more nocturia  His performance status is normal         Objective:      Primary Diagnosis:     1  T2 N0 M0 Mousie score 7 prostate cancer, diagnosed in March 2009       2  Metastatic prostate cancer at his prostate bed as well as inguinal lymph nodes   Diagnosed in October 2020       Cancer Staging:  Cancer Staging  No matching staging information was found for the patient         Previous Hematologic/ Oncologic Treatment:            Current Hematologic/ Oncologic Treatment:       Lupron 45 mg q 6 months  Since November 2020          Disease Status:         Good biochemical response      Test Results:     Pathology:           Radiology:           Laboratory:     See below for PSA      Physical Exam:        General Appearance:    Alert, oriented          Eyes:    PERRL   Ears:    Normal external ear canals, both ears   Nose:   Nares normal, septum midline   Throat:   Mucosa moist  Pharynx without injection  Neck:   Supple         Lungs:     Clear to auscultation bilaterally   Chest Wall:    No tenderness or deformity    Heart:    Regular rate and rhythm         Abdomen:     Soft, non-tender, bowel sounds +, no organomegaly               Extremities:   Extremities no cyanosis or edema         Skin:   no rash or icterus      Lymph nodes:   Cervical, supraclavicular, and axillary nodes normal   Neurologic:   CNII-XII intact, normal strength, sensation and reflexes     Throughout             Breast exam:   NA           ROS: Review of Systems   All other systems reviewed and are negative  Imaging: No results found  Labs: No results found for: WBC, HGB, HCT, MCV, PLT  No results found for: NA, K, CL, CO2, ANIONGAP, BUN, CREATININE, GLUCOSE, GLUF, CALCIUM, CORRECTEDCA, AST, ALT, ALKPHOS, PROT, BILITOT, EGFR        Lab Results   Component Value Date    PSA 0 7 04/27/2023    PSA 0 4 10/31/2022    PSA 0 4 04/22/2022           Current Medications: Reviewed  Allergies: Reviewed  PMH/FH/SH:  Reviewed      Vital Sign:    Body surface area is 2 22 meters squared      Wt Readings from Last 3 Encounters:   05/08/23 107 kg (236 lb)   11/07/22 104 kg (229 lb 8 oz)   05/05/22 102 kg (225 lb)        Temp Readings from Last 3 Encounters:   05/08/23 (!) 97 1 °F (36 2 °C) (Temporal)   11/07/22 97 9 °F (36 6 °C) (Tympanic)   11/07/22 (!) 96 7 °F (35 9 °C) (Tympanic)        BP Readings from Last 3 Encounters:   05/08/23 118/80   11/07/22 147/85   11/07/22 122/74         Pulse Readings from Last 3 Encounters:   05/08/23 69   11/07/22 61   11/07/22 61     @LASTSAO2(3)@

## 2023-05-08 NOTE — PROGRESS NOTES
Patient arrived to the unit and denied complications with his previous injections  There was a malfunction with the eligard syringe  He did not receive his injection and is rescheduled for Friday

## 2023-05-11 DIAGNOSIS — C61 PROSTATE CANCER (HCC): ICD-10-CM

## 2023-05-11 DIAGNOSIS — R97.21 RISING PSA FOLLOWING TREATMENT FOR MALIGNANT NEOPLASM OF PROSTATE: Primary | ICD-10-CM

## 2023-05-12 ENCOUNTER — HOSPITAL ENCOUNTER (OUTPATIENT)
Dept: INFUSION CENTER | Facility: CLINIC | Age: 78
Discharge: HOME | End: 2023-05-12

## 2023-05-12 VITALS
RESPIRATION RATE: 18 BRPM | DIASTOLIC BLOOD PRESSURE: 93 MMHG | SYSTOLIC BLOOD PRESSURE: 144 MMHG | HEART RATE: 66 BPM | TEMPERATURE: 97.6 F

## 2023-05-12 DIAGNOSIS — R97.21 RISING PSA FOLLOWING TREATMENT FOR MALIGNANT NEOPLASM OF PROSTATE: Primary | ICD-10-CM

## 2023-05-12 DIAGNOSIS — C61 PROSTATE CANCER (HCC): ICD-10-CM

## 2023-05-12 RX ADMIN — LEUPROLIDE ACETATE 45 MG: KIT SUBCUTANEOUS at 09:20

## 2023-05-12 NOTE — PROGRESS NOTES
Pt presents for eligard  No new meds or concerns  Pt tolerated infusion without adverse reaction  Future appointments discussed  AVS declined

## 2023-09-05 ENCOUNTER — TELEPHONE (OUTPATIENT)
Dept: HEMATOLOGY ONCOLOGY | Facility: CLINIC | Age: 78
End: 2023-09-05

## 2023-09-05 ENCOUNTER — APPOINTMENT (OUTPATIENT)
Dept: LAB | Facility: HOSPITAL | Age: 78
End: 2023-09-05
Payer: COMMERCIAL

## 2023-09-05 DIAGNOSIS — C61 PROSTATE CANCER (HCC): ICD-10-CM

## 2023-09-05 LAB
ALBUMIN SERPL BCP-MCNC: 4 G/DL (ref 3.5–5)
ALP SERPL-CCNC: 107 U/L (ref 34–104)
ALT SERPL W P-5'-P-CCNC: 23 U/L (ref 7–52)
ANION GAP SERPL CALCULATED.3IONS-SCNC: 7 MMOL/L
AST SERPL W P-5'-P-CCNC: 23 U/L (ref 13–39)
BILIRUB SERPL-MCNC: 0.41 MG/DL (ref 0.2–1)
BUN SERPL-MCNC: 19 MG/DL (ref 5–25)
CALCIUM SERPL-MCNC: 9.2 MG/DL (ref 8.4–10.2)
CHLORIDE SERPL-SCNC: 104 MMOL/L (ref 96–108)
CO2 SERPL-SCNC: 26 MMOL/L (ref 21–32)
CREAT SERPL-MCNC: 0.83 MG/DL (ref 0.6–1.3)
GFR SERPL CREATININE-BSD FRML MDRD: 84 ML/MIN/1.73SQ M
GLUCOSE P FAST SERPL-MCNC: 100 MG/DL (ref 65–99)
POTASSIUM SERPL-SCNC: 4.5 MMOL/L (ref 3.5–5.3)
PROT SERPL-MCNC: 7.2 G/DL (ref 6.4–8.4)
SODIUM SERPL-SCNC: 137 MMOL/L (ref 135–147)

## 2023-09-05 PROCEDURE — 80053 COMPREHEN METABOLIC PANEL: CPT

## 2023-09-05 PROCEDURE — 36415 COLL VENOUS BLD VENIPUNCTURE: CPT

## 2023-09-05 NOTE — TELEPHONE ENCOUNTER
Appointment Schedule   Who are you speaking with? Spouse   If it is not the patient, are they listed on an active communication consent form? Yes   Which provider is the appointment scheduled with? Dr. Lewis Boswell   At which location is the appointment scheduled for? [facility]   When is the appointment scheduled? Please list date and time 11/10/23 120   What is the reason for this appointment? f/u   Did patient voice understanding of the details of this appointment? Yes   Was the no show policy reviewed with patient?  Yes

## 2023-09-11 ENCOUNTER — HOSPITAL ENCOUNTER (OUTPATIENT)
Dept: CT IMAGING | Facility: HOSPITAL | Age: 78
Discharge: HOME | End: 2023-09-11
Payer: COMMERCIAL

## 2023-09-11 ENCOUNTER — HOSPITAL ENCOUNTER (OUTPATIENT)
Dept: NUCLEAR MEDICINE | Facility: HOSPITAL | Age: 78
Discharge: HOME | End: 2023-09-11
Payer: COMMERCIAL

## 2023-09-11 DIAGNOSIS — C61 PROSTATE CANCER (HCC): ICD-10-CM

## 2023-09-11 PROCEDURE — 78306 BONE IMAGING WHOLE BODY: CPT

## 2023-09-11 PROCEDURE — 74177 CT ABD & PELVIS W/CONTRAST: CPT

## 2023-09-11 PROCEDURE — A9503 TC99M MEDRONATE: HCPCS

## 2023-09-11 PROCEDURE — G1004 CDSM NDSC: HCPCS

## 2023-09-11 RX ADMIN — IOHEXOL 100 ML: 350 INJECTION, SOLUTION INTRAVENOUS at 13:05

## 2023-09-11 RX ADMIN — IOHEXOL 50 ML: 240 INJECTION, SOLUTION INTRATHECAL; INTRAVASCULAR; INTRAVENOUS; ORAL at 13:05

## 2023-09-14 ENCOUNTER — TELEPHONE (OUTPATIENT)
Dept: HEMATOLOGY ONCOLOGY | Facility: CLINIC | Age: 78
End: 2023-09-14

## 2023-09-14 NOTE — TELEPHONE ENCOUNTER
Called the radiology reading room to have scan read prior to patient's appointment on Monday 9/18.     ACCESSION# [id number]

## 2023-09-18 ENCOUNTER — OFFICE VISIT (OUTPATIENT)
Dept: HEMATOLOGY ONCOLOGY | Facility: CLINIC | Age: 78
End: 2023-09-18
Payer: COMMERCIAL

## 2023-09-18 VITALS
HEIGHT: 69 IN | HEART RATE: 55 BPM | SYSTOLIC BLOOD PRESSURE: 118 MMHG | DIASTOLIC BLOOD PRESSURE: 68 MMHG | WEIGHT: 233.5 LBS | TEMPERATURE: 96.9 F | BODY MASS INDEX: 34.58 KG/M2 | OXYGEN SATURATION: 96 % | RESPIRATION RATE: 18 BRPM

## 2023-09-18 DIAGNOSIS — R97.21 RISING PSA FOLLOWING TREATMENT FOR MALIGNANT NEOPLASM OF PROSTATE: ICD-10-CM

## 2023-09-18 DIAGNOSIS — C61 PROSTATE CANCER (HCC): Primary | ICD-10-CM

## 2023-09-18 PROCEDURE — 99214 OFFICE O/P EST MOD 30 MIN: CPT | Performed by: INTERNAL MEDICINE

## 2023-09-18 NOTE — PROGRESS NOTES
Hematology / Oncology Outpatient Follow Up Note    Maritza Zelaya 66 y.o. male Otilio Vu KFE:[id number]         Date:  9/18/2023    Assessment / Plan:  A 51-year-old gentleman with history of T2 N0 prostate cancer, [place] score 7 in 2009. He underwent radiation therapy. His PSA has been slowly rising since 2018. Based on the imaging study, he has recurrent disease in his prostate as well as right inguinal lymph node.  He has no bone metastasis. Since November 2020, he has been on androgen deprivation therapy with Eligard , resulting in good biochemical response. His PSA increased from 0.4 to 0.7 in May 2023. However, he still does not have detectable metastatic disease based on imaging study, including CT scan and bone scan. He is completely asymptomatic from cancer standpoint. Therefore, I recommended him to continue Eligard every 6 months. He will come back in 4 months with PSA. He is in agreement with my recommendations. Subjective:      HPI:  A 72-year-old gentleman who was initially diagnosed with T2 N0 prostate cancer in 2009, Berna score 7. He underwent radiation therapy.  In the last 2-3 years, his PSA has been slowly rising.  It was 1.8 in September 2018 which kenisha to 2.3 in September 2019.  Most recently in September 2020, PSA was 3.8. [facility] Dykes underwent PET-CT scan which showed FDG uptake at the prostate gland with SUV 4.1.  He had inguinal lymph node with SUV 3.1, suspicious for metastatic disease.  He has no bone metastasis.  He presents today with his daughter to discuss systemic therapy. Patricia Dykes feels well in usual state of health. Patricia Dykes has no complaint of pain.  His weight is stable.  He has nocturia only once per night. Patricia Dykes has no hematuria or dysuria. Patricia Dykes has no major past medical history except well-controlled hypertension. Patricia Dykes is a remote ex-smoker.  His performance status is normal.             Interval History:  A 51-year-old gentleman with history of T2 N0 prostate cancer, Berna score 7 in 2009. He underwent radiation therapy. His PSA has been slowly rising since 2018. Based on the imaging study, he has recurrent disease in his prostate as well as right inguinal lymph node.  He has no bone metastasis.   Since November 2020, he started on androgen deprivation therapy with Eligard, with excellent tolerance, resulting in good biochemical response. However, his PSA started to rise slowly in mid 2023. He recently underwent bone scan and CT scan of abdomen pelvis both of which showed no evidence of detectable metastatic disease. He presents today for follow-up. He feels well. He has no complaint of pain. He denied any respiratory symptoms. His weight is stable. He has mild fatigue. His performance status is normal.          Objective:      Primary Diagnosis:     1. T2 N0 M0 [place] score 7 prostate cancer, diagnosed in March 2009.      2. Metastatic prostate cancer at his prostate bed as well as inguinal lymph nodes.  Diagnosed in October 2020.      Cancer Staging:  Cancer Staging  No matching staging information was found for the patient.        Previous Hematologic/ Oncologic Treatment:            Current Hematologic/ Oncologic Treatment:       Lupron 45 mg q.6 months  Since November 2020.         Disease Status:         Good biochemical response.     Test Results:     Pathology:           Radiology:     CT scan of abdomen pelvis showed no evidence of metastatic disease. Bone scan showed no evidence of osseous metastasis.     Laboratory:     See below for PSA.     Physical Exam:        General Appearance:    Alert, oriented          Eyes:    PERRL   Ears:    Normal external ear canals, both ears   Nose:   Nares normal, septum midline   Throat:   Mucosa moist. Pharynx without injection.     Neck:   Supple         Lungs:     Clear to auscultation bilaterally   Chest Wall:    No tenderness or deformity    Heart:    Regular rate and rhythm         Abdomen:     Soft, non-tender, bowel sounds +, no organomegaly               Extremities:   Extremities no cyanosis or edema         Skin:   no rash or icterus. Lymph nodes:   Cervical, supraclavicular, and axillary nodes normal   Neurologic:   CNII-XII intact, normal strength, sensation and reflexes     Throughout             Breast exam:   NA           ROS: Review of Systems   All other systems reviewed and are negative. Imaging: No results found. Labs: No results found for: "WBC", "HGB", "HCT", "MCV", "PLT"  Lab Results   Component Value Date    K 4.5 09/05/2023     09/05/2023    CO2 26 09/05/2023    BUN 19 09/05/2023    CREATININE 0.83 09/05/2023    GLUF 100 (H) 09/05/2023    CALCIUM 9.2 09/05/2023    AST 23 09/05/2023    ALT 23 09/05/2023    ALKPHOS 107 (H) 09/05/2023    EGFR 84 09/05/2023           Lab Results   Component Value Date    PSA 0.7 04/27/2023    PSA 0.4 10/31/2022    PSA 0.4 04/22/2022           Current Medications: Reviewed  Allergies: Reviewed  PMH/FH/SH:  Reviewed      Vital Sign:    Body surface area is 2.21 meters squared.     Wt Readings from Last 3 Encounters:   09/18/23 106 kg (233 lb 8 oz)   05/08/23 107 kg (236 lb)   11/07/22 104 kg (229 lb 8 oz)        Temp Readings from Last 3 Encounters:   09/18/23 (!) 96.9 °F (36.1 °C) (Tympanic)   05/12/23 97.6 °F (36.4 °C) (Temporal)        BP Readings from Last 3 Encounters:   09/18/23 118/68   05/12/23 144/93   05/08/23 118/80         Pulse Readings from Last 3 Encounters:   09/18/23 55   05/12/23 66   05/08/23 69     @LASTSAO2(3)@

## 2023-10-04 ENCOUNTER — TELEPHONE (OUTPATIENT)
Dept: HEMATOLOGY ONCOLOGY | Facility: CLINIC | Age: 78
End: 2023-10-04

## 2023-10-04 NOTE — TELEPHONE ENCOUNTER
GENE  DR mario alberto CRUZ   Who are you speaking with? Patient   If it is not the patient, are they listed on an active communication consent form? Yes   Is this a GENE or DR mario alberto CRUZ GENE   Which provider is patient currently scheduled or established with? Dr. Purnima Renee   What is the original appointment date and time? 11/10/23   At which location is the appointment scheduled to take place? [facility]   Which provider is the patient transitioning care to? Dr. Dwayne Dunlap   What is the new appointment date and time? 11/8/23   At which location is the new appointment scheduled to take place? [facility]   What is the reason for this change?  Provider

## 2023-10-24 ENCOUNTER — HOSPITAL ENCOUNTER (EMERGENCY)
Facility: HOSPITAL | Age: 78
Discharge: HOME | End: 2023-10-24
Attending: EMERGENCY MEDICINE | Admitting: EMERGENCY MEDICINE
Payer: COMMERCIAL

## 2023-10-24 VITALS
OXYGEN SATURATION: 98 % | TEMPERATURE: 98 F | WEIGHT: 235 LBS | BODY MASS INDEX: 34.8 KG/M2 | DIASTOLIC BLOOD PRESSURE: 72 MMHG | SYSTOLIC BLOOD PRESSURE: 134 MMHG | HEIGHT: 69 IN | HEART RATE: 84 BPM | RESPIRATION RATE: 18 BRPM

## 2023-10-24 DIAGNOSIS — S61.219A FINGER LACERATION: Primary | ICD-10-CM

## 2023-10-24 PROCEDURE — G0168 WOUND CLOSURE BY ADHESIVE: HCPCS | Performed by: PHYSICIAN ASSISTANT

## 2023-10-24 PROCEDURE — 99283 EMERGENCY DEPT VISIT LOW MDM: CPT | Performed by: PHYSICIAN ASSISTANT

## 2023-10-24 PROCEDURE — 99282 EMERGENCY DEPT VISIT SF MDM: CPT

## 2023-10-24 NOTE — ED PROVIDER NOTES
History  Chief Complaint   Patient presents with    Finger Laceration     43-year-old male presents to the emergency department for evaluation of left thumb laceration. Patient states he was using a salt in his garage when he accidentally "skimmed the finger."  Leading controlled. Patient denies any use of blood thinners. He is up-to-date on tetanus. Prior to Admission Medications   Prescriptions Last Dose Informant Patient Reported? Taking? Cholecalciferol 2000 units CAPS   Yes No   Sig: Take 1 capsule by mouth daily   DAILY MULTIPLE VITAMINS PO   Yes No   Sig: Take 1 tablet by mouth daily   Methylprednisolone 4 MG TBPK   No No   Sig: Use as directed on package   OXYCODONE HCL PO   Yes No   Sig: Take by mouth Prn after knee surgery, unsure of dose   ascorbic acid (VITAMIN C) 250 mg tablet   Yes No   Sig: Take 250 mg by mouth daily   aspirin 81 mg chewable tablet   Yes No   Sig: Chew 1 tablet Daily   fluticasone (FLOVENT HFA) 110 MCG/ACT inhaler   Yes No   Sig: Inhale 2 puffs 2 (two) times a day Rinse mouth after use. glucosamine-chondroitin 500-400 MG tablet   Yes No   Sig: Take 1 tablet by mouth daily   lisinopril (ZESTRIL) 20 mg tablet   Yes No   Sig: Take 20 mg by mouth daily Takes 1/2 tab   rosuvastatin (CRESTOR) 20 MG tablet   Yes No   Sig: Take 1 tablet by mouth daily   zinc gluconate 50 mg tablet  Self Yes No   Sig: Take 50 mg by mouth daily      Facility-Administered Medications: None       Past Medical History:   Diagnosis Date    Cancer ([address] [facility])     Prostate Cancer    Lyme disease     Prostate cancer ([address] [facility]) 11/25/2008       Past Surgical History:   Procedure Laterality Date    COLONOSCOPY      PROSTATE BIOPSY  11/25/2008    TONSILLECTOMY AND ADENOIDECTOMY      age 25       Family History   Problem Relation Age of Onset    Rectal cancer Father     Colon cancer Paternal Grandfather      I have reviewed and agree with the history as documented.     E-Cigarette/Vaping    E-Cigarette Use Never User E-Cigarette/Vaping Substances     Social History     Tobacco Use    Smoking status: Former     Types: Cigarettes     Quit date:      Years since quittin.8    Smokeless tobacco: Never   Vaping Use    Vaping Use: Never used   Substance Use Topics    Alcohol use: Yes     Alcohol/week: 8.0 standard drinks of alcohol     Types: 8 Standard drinks or equivalent per week    Drug use: No       Review of Systems   Skin:  Positive for wound. All other systems reviewed and are negative. Physical Exam  Physical Exam  Vitals and nursing note reviewed. Constitutional:       General: He is not in acute distress. Appearance: Normal appearance. He is not ill-appearing, toxic-appearing or diaphoretic. HENT:      Head: Normocephalic. Eyes:      Conjunctiva/sclera: Conjunctivae normal.   Pulmonary:      Effort: Pulmonary effort is normal.   Musculoskeletal:         General: Normal range of motion. Skin:     General: Skin is warm and dry. Comments: There is a 1 cm superficial laceration to the kraft aspect of the left thumb. Wound. No active bleeding. No gross contamination. Neurological:      General: No focal deficit present. Mental Status: He is alert. Vital Signs  ED Triage Vitals [10/24/23 1611]   Temperature Pulse Respirations Blood Pressure SpO2   98 °F (36.7 °C) 84 18 134/72 98 %      Temp src Heart Rate Source Patient Position - Orthostatic VS BP Location FiO2 (%)   -- Monitor -- Right arm --      Pain Score       No Pain           Vitals:    10/24/23 1611   BP: 134/72   Pulse: 84         Visual Acuity      ED Medications  Medications - No data to display    Diagnostic Studies  Results Reviewed       None                   No orders to display              Procedures  Universal Protocol:  Consent: Verbal consent obtained.   Risks and benefits: risks, benefits and alternatives were discussed  Consent given by: patient  Timeout called at: 10/24/2023 4:34 PM.  Patient identity confirmed: verbally with patient and arm band  Laceration repair    Date/Time: 10/24/2023 4:34 PM    Performed by: Clara Alcantar PA-C  Authorized by: Clara Alcantar PA-C  Body area: upper extremity  Location details: left thumb  Laceration length: 1 cm  Tendon involvement: none  Nerve involvement: none    Sedation:  Patient sedated: no      Wound Dehiscence:  Superficial Wound Dehiscence: simple closure      Procedure Details:  Irrigation solution: saline  Irrigation method: syringe  Amount of cleaning: standard  Debridement: none  Degree of undermining: none  Skin closure: glue  Patient tolerance: patient tolerated the procedure well with no immediate complications               ED Course  ED Course as of 10/24/23 1746   Tue Oct 24, 2023   1643 Wound clean, skin glue applied. Bandage applied. SBIRT 20yo+      Flowsheet Row Most Recent Value   Initial Alcohol Screen: US AUDIT-C     1. How often do you have a drink containing alcohol? 0 Filed at: 10/24/2023 1611   2. How many drinks containing alcohol do you have on a typical day you are drinking? 0 Filed at: 10/24/2023 1611   3a. Male UNDER 65: How often do you have five or more drinks on one occasion? 0 Filed at: 10/24/2023 1611   3b. FEMALE Any Age, or MALE 65+: How often do you have 4 or more drinks on one occassion? 0 Filed at: 10/24/2023 1611   Audit-C Score 0 Filed at: 10/24/2023 1611   JUSTIN: How many times in the past year have you. .. Used an illegal drug or used a prescription medication for non-medical reasons? Never Filed at: 10/24/2023 1611                      Medical Decision Making  66-year-old male presented to the emergency department for evaluation of finger laceration. Vitals and medical record reviewed. Wound was cleaned. Superficial.  No tendon or ligament involvement. Normal range of motion with no bony tenderness. Amount of skin glue used to further close the wound. Dressing was applied.   Patient was educated on wound care and return precautions and verbalized understanding. He is clinically and hemodynamically stable for discharge             Disposition  Final diagnoses:   Finger laceration     Time reflects when diagnosis was documented in both MDM as applicable and the Disposition within this note       Time User Action Codes Description Comment    10/24/2023  4:44 PM Scott Borrero Add [B50.272W] Finger laceration           ED Disposition       ED Disposition   Discharge    Condition   Stable    Date/Time   Tue Oct 24, 2023 1644    [address] discharge to home/self care.                    Follow-up Information       Follow up With Specialties Details Why [address] In 1 week For wound re-check, As needed [place] [address] [place]  [phone]              Discharge Medication List as of 10/24/2023  4:44 PM        CONTINUE these medications which have NOT CHANGED    Details   ascorbic acid (VITAMIN C) 250 mg tablet Take 250 mg by mouth daily, Historical Med      aspirin 81 mg chewable tablet Chew 1 tablet Daily, Historical Med      Cholecalciferol 2000 units CAPS Take 1 capsule by mouth daily, Historical Med      DAILY MULTIPLE VITAMINS PO Take 1 tablet by mouth daily, Historical Med      fluticasone (FLOVENT HFA) 110 MCG/ACT inhaler Inhale 2 puffs 2 (two) times a day Rinse mouth after use., Historical Med      glucosamine-chondroitin 500-400 MG tablet Take 1 tablet by mouth daily, Historical Med      lisinopril (ZESTRIL) 20 mg tablet Take 20 mg by mouth daily Takes 1/2 tab, Historical Med      Methylprednisolone 4 MG TBPK Use as directed on package, Normal      OXYCODONE HCL PO Take by mouth Prn after knee surgery, unsure of dose, Historical Med      rosuvastatin (CRESTOR) 20 MG tablet Take 1 tablet by mouth daily, Historical Med      zinc gluconate 50 mg tablet Take 50 mg by mouth daily, Historical Med No discharge procedures on file.     PDMP Review       None            ED Provider  Electronically Signed by             Usman Perez PA-C  10/24/23 0813 13-Sep-2018

## 2023-10-24 NOTE — DISCHARGE INSTRUCTIONS
Please keep wound clean and dry as we discussed.   Follow-up with your family doctor and return with any new or worsening symptoms

## 2023-10-29 NOTE — PROGRESS NOTES
Hematology/Oncology Outpatient Office Note    Date of Service: 2023    [facility] SHRUTI [place]oldan  [phone]    Reason for Consultation:   Chief Complaint   Patient presents with    Follow-up       Cancer Stage at diagnosis: II, interval metastatic recurrence    Referral Physician: No ref. provider found    Primary Care Physician:  No primary care provider on file. Nickname: Susan Varela    Spouse: Rafael    Original ECO    Today's ECO    Goals and Barriers:  Current Goal: Minimize effects of disease burden, extend life. Barriers to accomplishing this: None    Patient's Capacity to Self Care:  Patient is able to self care    Code Status: not addressed today    Advanced directives: not addressed today    ASSESSMENT & PLAN      Diagnosis ICD-10-CM Associated Orders   1. Prostate cancer [facility])  C61             This is a 66 y.o. c PMHx notable for Lyme disease, being seen in consultation for recurrent metastatic prostate cancer    Pt was diagnosed 3/2009 with cT2N0, G7 prostate cancer s/p EBRT. PSA was noted to be rising 2018. Imaging showed inguinal LN involvement and was started on Eligard and gets it q 6 months. Discussion of decision making  Oncology history updated, accordingly, during this visit  Goals of care/patient communication  I discussed with the patient the clinical course leading up to their cancer diagnosis. I reviewed relevant office notes, imaging reports and pathology result as well. I told the patient that this is a case incurable disease and what this means. We discussed that the goal of anti-cancer therapy is to provide best quality of life, extend overall survival, and progression free survival as shown in clinical trials. We also discussed that there might be a point when the cancer will no longer respond to this anti-neoplastic therapy.    I explained the risks/benefits of the proposed cancer therapy: Eligard and after discussion including understanding risks of possible life-threatening complications and therapy-related malignancy development, informed consent for blood products and treatment has been signed and obtained. TNM/Staging At Diagnosis  cT2 N0 M0 [employer] score 7 prostate cancer  Cancer Staging   II  Disease Features/Tumor Markers/Genetics  Tumor Marker: PSA  10/31/2022: 0.4  4/27/2023: 0.7  11/6/2023: 0.87  Notable Path Features: G7  Treatment: Eligard  Other Supportive care:   Treatment Team Members  Surgeon  Rad Onc: Dr. Jailyn Painter  10/5/2020 PET/CT Axumin: Heterogeneous radiotracer uptake at the prostate gland suspicious for disease recurrence. A small left inguinal lymph node with Axumin activity greater than blood pool raises suspicion for metastasis  9/11/2023 NM Bone scan: No scintigraphic evidence of osseous metastasis  CT Abd/pelv w/c: No evidence of metastatic disease in the abdomen or pelvis. Decreased size of a 7 x 6 mm left inguinal lymph node, previously described on prior PET/CT      Discussion of decision making    I personally reviewed the following lab results, the image studies, pathology, other specialty/physicians consult notes and recommendations, and outside medical records. I had a lengthy discussion with the patient and shared the work-up findings. We discussed the diagnosis and management plan as below. I spent 42 minutes reviewing the records (labs, clinician notes, outside records, medical history, ordering medicine/tests/procedures, interpreting the imaging/labs previously done) and coordination of care as well as direct time with the patient today, of which greater than 50% of the time was spent in counseling and coordination of care with the patient/family.     Plan/Labs  -Cont q6 months Eligard and is next due 11/12/2023   -F/u Urology  -CBC, CMP, PSA q3 months ordered as standing until 10/2024      Follow Up: 3 months    All questions were answered to the patient's satisfaction during this encounter. The patient knows the contact information for our office and knows to reach out for any relevant concerns related to this encounter. They are to call for any temperature 100.4 or higher, new symptoms including but not restricted to shaking chills, decreased appetite, nausea, vomiting, diarrhea, increased fatigue, shortness of breath or chest pain, confusion, and not feeling the strength to come to the clinic. For all other listed problems and medical diagnosis in their chart - they are managed by PCP and/or other specialists, which the patient acknowledges. Thank you very much for your consultation and making us a part of this patient's care. We are continuing to follow closely with you. Please do not hesitate to reach out to me with any additional questions or concerns. Francine Phelan MD  Hematology & Medical Oncology Staff Physician             Disclaimer: This document was prepared using [employer] technology. If a word or phrase is confusing, or does not make sense, this is likely due to recognition error which was not discovered during this clinician's review. If you believe an error has occurred, please contact me through [address] [facility] line for shandra? cation. ONCOLOGY HISTORY OF PRESENT ILLNESS        Oncology History Overview Note   68y.o. year old male completed definitive radiation therapy for Berna score 6 stage II prostate adenocarcinoma on 4/1/2009  with Tom[facility]Therapy at [facility].  PET scan 10/5/20  was consistent with recurrent disease in the prostate and a small left inguinal lymph node that is suspicious. He is currently on Casodex and Eligard.     He was last seen in radiation oncology on 11/17/21 and returns for his annual follow-up    Lab Results   Component Value Date    PSA 0.4 04/22/2022    PSA 3.8 09/04/2020    PSA 2.3 09/03/2019 5/5/22  Alden Nose  Clinically stable, PSA remains suppressed. Recommend cont. Eligard. F/U in 6  months with PSA    11/7/22  Gerardo Mitchell NOTE       Prostate cancer [facility])   11/25/2008 Initial Diagnosis    Prostate cancer ([address] [facility])  - pretreatment PSA was 4.2 NG/ML.      11/25/2008 Biopsy    Prostate biopsy: adenocarcinoma, primarily Berna score 3+3=6 with one focus within the left lateral mid gland with [place] score 3+4=7.    - Dr Malik Monroe     11/25/2008 -  Cancer Staged    Stage II, T2 N0 M0, [place] 7     2/2/2009 - 4/1/2009 Radiation    TomoTherapy - site: prostate. Dose 7560 cGy at [facility]   - Dr Zeus Huber     11/13/2020 -  Hormone Therapy    30 days of Casodex, Lupron injection           SUBJECTIVE  (INTERVAL HISTORY)      Clotting History None   Bleeding History None   Cancer History Prostate   Family Cancer History None   H/O Blood/Plt Transfusion None   Tobacco/etoh Remote smoker 1/4 PPD 30 years, drinks 4 cans of beer 2-3X x week       Cancer Screening history N/a   Occupation  (still works)     Pain: none    He gets several daily hot flashes, daytime sweating on Lupron that doesn't bother him. I have reviewed the relevant past medical, surgical, social and family history. I have also reviewed allergies and medications for this patient. Review of Systems  Baseline weight: 230 lbs    Denies F/C, N/V, SOB, CP, LH, HA, rash, itching, gen weakness, melena, hematuria, hematochezia, falls, diarrhea, or constipation     A 10-point review of system was performed, pertinent positive and negative were detailed as above. Otherwise, the 10-point review of system was negative.       Past Medical History:   Diagnosis Date    Cancer [facility])     Prostate Cancer    Lyme disease     Prostate cancer ([address] [facility]) 11/25/2008       Past Surgical History:   Procedure Laterality Date    COLONOSCOPY      PROSTATE BIOPSY  11/25/2008    TONSILLECTOMY AND ADENOIDECTOMY      age 25       Family History   Problem Relation Age of Onset    Rectal cancer Father     Colon cancer Paternal Grandfather        Social History     Socioeconomic History    Marital status: /Civil Union     Spouse name: Not on file    Number of children: Not on file    Years of education: Not on file    Highest education level: Not on file   Occupational History    Not on file   Tobacco Use    Smoking status: Former     Types: Cigarettes     Quit date:      Years since quittin.8    Smokeless tobacco: Never   Vaping Use    Vaping Use: Never used   Substance and Sexual Activity    Alcohol use: Yes     Alcohol/week: 8.0 standard drinks of alcohol     Types: 8 Standard drinks or equivalent per week    Drug use: No    Sexual activity: Not on file   Other Topics Concern    Not on file   Social History Narrative    Not on file     Social Determinants of Health     Financial Resource Strain: Not on file   Food Insecurity: Not on file   Transportation Needs: Not on file   Physical Activity: Not on file   Stress: Not on file   Social Connections: Not on file   Intimate Partner Violence: Not on file   Housing Stability: Not on file       Allergies   Allergen Reactions    Erythromycin Base Nausea Only    Penicillins Hives       Current Outpatient Medications   Medication Sig Dispense Refill    ascorbic acid (VITAMIN C) 250 mg tablet Take 250 mg by mouth daily      aspirin 81 mg chewable tablet Chew 1 tablet Daily      Cholecalciferol 2000 units CAPS Take 1 capsule by mouth daily      DAILY MULTIPLE VITAMINS PO Take 1 tablet by mouth daily      fluticasone (FLOVENT HFA) 110 MCG/ACT inhaler Inhale 2 puffs 2 (two) times a day Rinse mouth after use.       glucosamine-chondroitin 500-400 MG tablet Take 1 tablet by mouth daily      lisinopril (ZESTRIL) 20 mg tablet Take 20 mg by mouth daily Takes 1/2 tab      Methylprednisolone 4 MG TBPK Use as directed on package 21 tablet 0    OXYCODONE HCL PO Take by mouth Prn after knee surgery, unsure of dose      rosuvastatin (CRESTOR) 20 MG tablet Take 1 tablet by mouth daily      zinc gluconate 50 mg tablet Take 50 mg by mouth daily       No current facility-administered medications for this visit. (Not in a hospital admission)      Objective:     24 Hour Vitals Assessment:     Vitals:    11/08/23 1328   BP: 122/80   Pulse: 66   Resp: 18   Temp: (!) 96.7 °F (35.9 °C)   SpO2: 97%       PHYSICIAN EXAM:    General: Appearance: alert, cooperative, no distress. HEENT: Normocephalic, atraumatic. No scleral icterus. conjunctivae clear. EOMI. Chest: No tenderness to palpation. No open wound noted. Lungs: Clear to auscultation bilaterally, Respirations unlabored. Cardiac: Regular rate and rhythm, +S1and S2  Abdomen: Soft, non-tender, non-distended. Bowel sounds are normal.  Extremities:  No edema, cyanosis, clubbing. Skin: Skin color, turgor are normal. No rashes. Lymphatics: no palpable supra-cervical, axillary, or inguinal adenopathy  Neurologic: Awake, Alert, and oriented, no gross focal deficits noted b/l. DATA REVIEW:    Pathology Result:    No results found for: "FINALDX"     Image Results:   Image result are reviewed and documented in Hematology/Oncology history    CT abdomen pelvis w contrast  Narrative: CT ABDOMEN AND PELVIS WITH IV CONTRAST    INDICATION:   C61: Malignant neoplasm of prostate. COMPARISON: PET/CT [id number] 20.    TECHNIQUE:  CT examination of the abdomen and pelvis was performed. Multiplanar 2D reformatted images were created from the source data. This examination, like all CT scans performed in the [facility], was performed utilizing techniques to minimize radiation dose exposure, including the use of iterative reconstruction and automated exposure control. Radiation dose length   product (DLP) for this visit:  912 mGy-cm    IV Contrast:  50 mL of iohexol (OMNIPAQUE) 100 mL of iohexol (OMNIPAQUE)  Enteric Contrast: Enteric contrast was administered.     FINDINGS:    ABDOMEN    LOWER CHEST: No clinically significant abnormality identified in the visualized lower chest.    LIVER/BILIARY TREE: One or more subcentimeter sharply circumscribed low-density hepatic lesion(s) are noted, too small to accurately characterize, but statistically most likely to represent subcentimeter hepatic cysts. No suspicious solid hepatic lesion   is identified. Hepatic contours are normal.  No biliary dilatation. GALLBLADDER:  No calcified gallstones. No pericholecystic inflammatory change. SPLEEN:  Unremarkable. PANCREAS:  Unremarkable. ADRENAL GLANDS:  Unremarkable. KIDNEYS/URETERS: No hydronephrosis or urinary tract calculus. 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 One or more simple cysts and sharply circumscribed subcentimeter renal hypodensities are present, too small to accurately   characterize, and statistically most likely benign findings. According to recent literature (Radiology 2019) no further workup of these findings is recommended. STOMACH AND BOWEL:  Unremarkable. APPENDIX:  No findings to suggest appendicitis. ABDOMINOPELVIC CAVITY:  No ascites. No pneumoperitoneum. No lymphadenopathy. Decreased size of a 7 x 6 mm left inguinal lymph node, previously previously 10 x 8 mm, as described on prior PET/CT. VESSELS:  Atherosclerotic changes are present. No evidence of aneurysm. PELVIS    REPRODUCTIVE ORGANS:  Unremarkable for patient's age. URINARY BLADDER:  Unremarkable. ABDOMINAL WALL/INGUINAL REGIONS: Small bilateral fat-containing inguinal hernias. OSSEOUS STRUCTURES: No acute fracture or destructive osseous lesion. Spinal degenerative changes are noted. Unchanged tiny sclerotic focus in the right femoral neck, likely a bone island. Impression: 1. No evidence of metastatic disease in the abdomen or pelvis. 2. Decreased size of a 7 x 6 mm left inguinal lymph node, previously described on prior PET/CT.     3. Scattered subcentimeter hepatic hypodensities, too small to definitively characterize, but statistically likely benign. However, given history of malignancy, attention on follow-up imaging is recommended. Workstation performed: [id number]      LABS:  Lab data are reviewed and documented in HemOnc history. No results found for: "HGB", "HCT", "MCV", "PLT", "WBC", "MONOS", "NRBC", "BANDSPCT", "ATYLMPCT", "BLASTSPCT"  Lab Results   Component Value Date    K 4.5 09/05/2023     09/05/2023    CO2 26 09/05/2023    BUN 19 09/05/2023    CREATININE 0.83 09/05/2023    GLUF 100 (H) 09/05/2023    CALCIUM 9.2 09/05/2023    AST 23 09/05/2023    ALT 23 09/05/2023    ALKPHOS 107 (H) 09/05/2023    EGFR 84 09/05/2023       No results found for: "IRON", "TIBC", "FERRITIN"    No results found for: "Dwaine Lilly"    No results for input(s): "WBC", "CREAT", "PLT" in the last 72 hours.     By:  Marcie Holt MD, 11/8/2023, 2:09 PM

## 2023-11-06 ENCOUNTER — APPOINTMENT (OUTPATIENT)
Dept: LAB | Facility: HOSPITAL | Age: 78
End: 2023-11-06
Payer: COMMERCIAL

## 2023-11-06 DIAGNOSIS — R97.21 RISING PSA FOLLOWING TREATMENT FOR MALIGNANT NEOPLASM OF PROSTATE: ICD-10-CM

## 2023-11-06 DIAGNOSIS — C61 PROSTATE CANCER (HCC): ICD-10-CM

## 2023-11-06 DIAGNOSIS — C61 PROSTATE CANCER (HCC): Primary | ICD-10-CM

## 2023-11-06 PROCEDURE — 84153 ASSAY OF PSA TOTAL: CPT

## 2023-11-06 PROCEDURE — 36415 COLL VENOUS BLD VENIPUNCTURE: CPT

## 2023-11-07 LAB — PSA SERPL-MCNC: 0.87 NG/ML (ref 0–4)

## 2023-11-08 ENCOUNTER — OFFICE VISIT (OUTPATIENT)
Dept: HEMATOLOGY ONCOLOGY | Facility: CLINIC | Age: 78
End: 2023-11-08
Payer: COMMERCIAL

## 2023-11-08 ENCOUNTER — HOSPITAL ENCOUNTER (OUTPATIENT)
Dept: INFUSION CENTER | Facility: CLINIC | Age: 78
Discharge: HOME | End: 2023-11-08
Payer: COMMERCIAL

## 2023-11-08 VITALS
BODY MASS INDEX: 34.58 KG/M2 | DIASTOLIC BLOOD PRESSURE: 80 MMHG | SYSTOLIC BLOOD PRESSURE: 122 MMHG | RESPIRATION RATE: 18 BRPM | WEIGHT: 233.5 LBS | HEART RATE: 66 BPM | TEMPERATURE: 96.7 F | OXYGEN SATURATION: 97 % | HEIGHT: 69 IN

## 2023-11-08 VITALS
RESPIRATION RATE: 16 BRPM | DIASTOLIC BLOOD PRESSURE: 81 MMHG | HEART RATE: 67 BPM | TEMPERATURE: 97.6 F | SYSTOLIC BLOOD PRESSURE: 130 MMHG

## 2023-11-08 DIAGNOSIS — R97.21 RISING PSA FOLLOWING TREATMENT FOR MALIGNANT NEOPLASM OF PROSTATE: ICD-10-CM

## 2023-11-08 DIAGNOSIS — C61 PROSTATE CANCER (HCC): Primary | ICD-10-CM

## 2023-11-08 PROCEDURE — 99215 OFFICE O/P EST HI 40 MIN: CPT | Performed by: INTERNAL MEDICINE

## 2023-11-08 PROCEDURE — 96402 CHEMO HORMON ANTINEOPL SQ/IM: CPT

## 2023-11-08 RX ADMIN — LEUPROLIDE ACETATE 45 MG: KIT SUBCUTANEOUS at 12:25

## 2023-11-08 NOTE — PLAN OF CARE
Problem: INFECTION - ADULT  Goal: Absence or prevention of progression during hospitalization  Description: INTERVENTIONS:  - Assess and monitor for signs and symptoms of infection  - Monitor lab/diagnostic results  - Monitor all insertion sites, i.e. indwelling lines, tubes, and drains  - Monitor endotracheal if appropriate and nasal secretions for changes in amount and color  - [place] appropriate cooling/warming therapies per order  - Administer medications as ordered  - Instruct and encourage patient and family to use good hand hygiene technique  - Identify and instruct in appropriate isolation precautions for identified infection/condition  Outcome: Progressing  Goal: Absence of fever/infection during neutropenic period  Description: INTERVENTIONS:  - Monitor WBC    Outcome: Progressing     Problem: Knowledge Deficit  Goal: Patient/family/caregiver demonstrates understanding of disease process, treatment plan, medications, and discharge instructions  Description: Complete learning assessment and assess knowledge base.   Interventions:  - Provide teaching at level of understanding  - Provide teaching via preferred learning methods  Outcome: Progressing

## 2023-11-08 NOTE — PROGRESS NOTES
Pt. Denied new symptoms or concerns today. Eligard given SQ in JEM. Pt. Tolerated well. Future appointments to be scheduled per physician. AVS declined.

## 2024-01-23 ENCOUNTER — TELEPHONE (OUTPATIENT)
Dept: HEMATOLOGY ONCOLOGY | Facility: CLINIC | Age: 79
End: 2024-01-23

## 2024-02-01 ENCOUNTER — APPOINTMENT (OUTPATIENT)
Dept: LAB | Facility: HOSPITAL | Age: 79
End: 2024-02-01
Payer: COMMERCIAL

## 2024-02-11 NOTE — PROGRESS NOTES
Hematology/Oncology Outpatient Office Note    Date of Service: 2024    [facility]  [address]i [address] [id number]  [phone]    Reason for Consultation:   Chief Complaint   Patient presents with    Follow-up       Cancer Stage at diagnosis: II, interval metastatic recurrence    Referral Physician: No ref. provider found    Primary Care Physician:  No primary care provider on file.     Nickname: Yung    Spouse: Rafael    Original ECO    Today's ECO    Goals and Barriers:  Current Goal: Minimize effects of disease burden, extend life.   Barriers to accomplishing this: None    Patient's Capacity to Self Care:  Patient is able to self care    Code Status: not addressed today    Advanced directives: not addressed today    ASSESSMENT & PLAN      Diagnosis ICD-10-CM Associated Orders   1. Prostate cancer (HCC)  C61             This is a 78 y.o. c PMHx notable for Lyme disease, being seen in consultation for recurrent metastatic prostate cancer    Pt was diagnosed 3/2009 with cT2N0, G7 prostate cancer s/p EBRT. PSA was noted to be rising 2018. Imaging showed inguinal LN involvement and was started on Eligard and gets it q 6 months.    Discussion of decision making  Oncology history updated, accordingly, during this visit  Goals of care/patient communication  I discussed with the patient the clinical course leading up to their cancer diagnosis. I reviewed relevant office notes, imaging reports and pathology result as well.  I told the patient that this is a case incurable disease and what this means. We discussed that the goal of anti-cancer therapy is to provide best quality of life, extend overall survival, and progression free survival as shown in clinical trials. We also discussed that there might be a point when the cancer will no longer respond to this anti-neoplastic therapy.   I explained the risks/benefits of the proposed  cancer therapy: Eligard and after discussion including understanding risks of possible life-threatening complications and therapy-related malignancy development, informed consent for blood products and treatment has been signed and obtained.  TNM/Staging At Diagnosis  cT2 N0 M0 Berna score 7 prostate cancer  Cancer Staging   II  Disease Features/Tumor Markers/Genetics  Tumor Marker: PSA  10/31/2022: 0.4  4/27/2023: 0.7  11/6/2023: 0.87  2/1/2024: PSA 0.96  Notable Path Features: G7  Treatment: Eligard  Other Supportive care:   Treatment Team Members  Surgeon  Rad Onc: Dr. Brand  Palliative  Labs  Diagnostics  10/5/2020 PET/CT Axumin: Heterogeneous radiotracer uptake at the prostate gland suspicious for disease recurrence. A small left inguinal lymph node with Axumin activity greater than blood pool raises suspicion for metastasis  9/11/2023 NM Bone scan: No scintigraphic evidence of osseous metastasis  CT Abd/pelv w/c: No evidence of metastatic disease in the abdomen or pelvis. Decreased size of a 7 x 6 mm left inguinal lymph node, previously described on prior PET/CT    PSA doubling rate is >1 year    Discussion of decision making    I personally reviewed the following lab results, the image studies, pathology, other specialty/physicians consult notes and recommendations, and outside medical records. I had a lengthy discussion with the patient and shared the work-up findings. We discussed the diagnosis and management plan as below. I spent 41 minutes reviewing the records (labs, clinician notes, outside records, medical history, ordering medicine/tests/procedures, interpreting the imaging/labs previously done) and coordination of care as well as direct time with the patient today, of which greater than 50% of the time was spent in counseling and coordination of care with the patient/family.    Plan/Labs  -Cont q6 months Eligard   -F/u Urology  NO role for oral ADT at this time, watching the PSA trend  closely  -CBC, CMP, PSA q3 months ordered as standing until 10/2024      Follow Up: 6 months    All questions were answered to the patient's satisfaction during this encounter. The patient knows the contact information for our office and knows to reach out for any relevant concerns related to this encounter. They are to call for any temperature 100.4 or higher, new symptoms including but not restricted to shaking chills, decreased appetite, nausea, vomiting, diarrhea, increased fatigue, shortness of breath or chest pain, confusion, and not feeling the strength to come to the clinic. For all other listed problems and medical diagnosis in their chart - they are managed by PCP and/or other specialists, which the patient acknowledges. Thank you very much for your consultation and making us a part of this patient's care. We are continuing to follow closely with you. Please do not hesitate to reach out to me with any additional questions or concerns.    Francine Kemp MD  Hematology & Medical Oncology Staff Physician             Disclaimer: This document was prepared using [employer] technology. If a word or phrase is confusing, or does not make sense, this is likely due to recognition error which was not discovered during this clinician's review. If you believe an error has occurred, please contact me through [employer] service line for shandra?cation.      ONCOLOGY HISTORY OF PRESENT ILLNESS        Oncology History Overview Note   77 y.o. year old male completed definitive radiation therapy for Berna score 6 stage II prostate adenocarcinoma on 4/1/2009  with TomoTherapy at [facility].  PET scan 10/5/20  was consistent with recurrent disease in the prostate and a small left inguinal lymph node that is suspicious.  He is currently on Casodex and Eligard.    He was last seen in radiation oncology on 11/17/21 and returns for his annual follow-up    Lab Results   Component Value Date    PSA 0.4 04/22/2022     PSA 3.8 09/04/2020    PSA 2.3 09/03/2019 5/5/22  Rocio  Clinically stable, PSA remains suppressed.  Recommend cont. Eligard.  F/U in 6  months with PSA    11/7/22  Rocio Mitchell  CHECK NOTE       Prostate cancer (HCC)   11/25/2008 Initial Diagnosis    Prostate cancer (HCC)  - pretreatment PSA was 4.2 NG/ML.      11/25/2008 Biopsy    Prostate biopsy: adenocarcinoma, primarily [place] score 3+3=6 with one focus within the left lateral mid gland with Berna score 3+4=7.    - Dr Andrew Bell     11/25/2008 -  Cancer Staged    Stage II, T2 N0 M0, [place] 7     2/2/2009 - 4/1/2009 Radiation    TomoTherapy - site: prostate. Dose 7560 cGy at [facility]   - Dr Brand     11/13/2020 -  Hormone Therapy    30 days of Casodex, Lupron injection           SUBJECTIVE  (INTERVAL HISTORY)      Clotting History None   Bleeding History None   Cancer History Prostate   Family Cancer History None   H/O Blood/Plt Transfusion None   Tobacco/etoh Remote smoker 1/4 PPD 30 years, drinks 4 cans of beer 2-3X x week       Cancer Screening history N/a   Occupation  (still works)     Pain: none    He gets several daily hot flashes, daytime sweating on Lupron that doesn't bother him. Mild, intermittent LH.    I have reviewed the relevant past medical, surgical, social and family history. I have also reviewed allergies and medications for this patient.    Review of Systems  Baseline weight: 230 lbs    Denies F/C, N/V, SOB, CP, HA, rash, itching, gen weakness, melena, hematuria, hematochezia, falls, diarrhea, or constipation     A 10-point review of system was performed, pertinent positive and negative were detailed as above. Otherwise, the 10-point review of system was negative.      Past Medical History:   Diagnosis Date    Cancer (HCC)     Prostate Cancer    Lyme disease     Prostate cancer (HCC) 11/25/2008       Past Surgical History:   Procedure Laterality Date    COLONOSCOPY      PROSTATE BIOPSY   2008    TONSILLECTOMY AND ADENOIDECTOMY      age 18       Family History   Problem Relation Age of Onset    Rectal cancer Father     Colon cancer Paternal Grandfather        Social History     Socioeconomic History    Marital status: /Civil Union     Spouse name: Not on file    Number of children: Not on file    Years of education: Not on file    Highest education level: Not on file   Occupational History    Not on file   Tobacco Use    Smoking status: Former     Current packs/day: 0.00     Types: Cigarettes     Quit date:      Years since quittin.1    Smokeless tobacco: Never   Vaping Use    Vaping status: Never Used   Substance and Sexual Activity    Alcohol use: Yes     Alcohol/week: 8.0 standard drinks of alcohol     Types: 8 Standard drinks or equivalent per week    Drug use: No    Sexual activity: Not on file   Other Topics Concern    Not on file   Social History Narrative    Not on file     Social Determinants of Health     Financial Resource Strain: Not on file   Food Insecurity: Not on file   Transportation Needs: Not on file   Physical Activity: Not on file   Stress: Not on file   Social Connections: Not on file   Intimate Partner Violence: Not on file   Housing Stability: Not on file       Allergies   Allergen Reactions    Erythromycin Base Nausea Only    Penicillins Hives       Current Outpatient Medications   Medication Sig Dispense Refill    ascorbic acid (VITAMIN C) 250 mg tablet Take 250 mg by mouth daily      aspirin 81 mg chewable tablet Chew 1 tablet Daily      Cholecalciferol 2000 units CAPS Take 1 capsule by mouth daily      DAILY MULTIPLE VITAMINS PO Take 1 tablet by mouth daily      fluticasone (FLOVENT HFA) 110 MCG/ACT inhaler Inhale 2 puffs 2 (two) times a day Rinse mouth after use.      glucosamine-chondroitin 500-400 MG tablet Take 1 tablet by mouth daily      lisinopril (ZESTRIL) 20 mg tablet Take 20 mg by mouth daily Takes 1/2 tab      Methylprednisolone 4 MG TBPK  "Use as directed on package 21 tablet 0    OXYCODONE HCL PO Take by mouth Prn after knee surgery, unsure of dose      rosuvastatin (CRESTOR) 20 MG tablet Take 1 tablet by mouth daily      zinc gluconate 50 mg tablet Take 50 mg by mouth daily       No current facility-administered medications for this visit.       (Not in a hospital admission)      Objective:     24 Hour Vitals Assessment:     Vitals:    02/21/24 0954   BP: 122/74   Pulse: 57   Resp: 18   Temp: (!) 96.9 °F (36.1 °C)   SpO2: 95%         PHYSICIAN EXAM:    General: Appearance: alert, cooperative, no distress.  HEENT: Normocephalic, atraumatic. No scleral icterus. conjunctivae clear. EOMI.  Chest: No tenderness to palpation. No open wound noted.  Lungs: Clear to auscultation bilaterally, Respirations unlabored.  Cardiac: Regular rate (borderline) , +S1and S2  Abdomen: Soft, non-tender, non-distended. Bowel sounds are normal.  Extremities:  No edema, cyanosis, clubbing.  Skin: Skin color, turgor are normal. No rashes.  Lymphatics: no palpable supra-cervical, axillary, or inguinal adenopathy  Neurologic: Awake, Alert, and oriented, no gross focal deficits noted b/l.       DATA REVIEW:    Pathology Result:    No results found for: \"FINALDX\"     Image Results:   Image result are reviewed and documented in Hematology/Oncology history    CT abdomen pelvis w contrast  Narrative: CT ABDOMEN AND PELVIS WITH IV CONTRAST    INDICATION:   C61: Malignant neoplasm of prostate.    COMPARISON: PET/CT 84393 20.    TECHNIQUE:  CT examination of the abdomen and pelvis was performed. Multiplanar 2D reformatted images were created from the source data.    This examination, like all CT scans performed in the [facility], was performed utilizing techniques to minimize radiation dose exposure, including the use of iterative reconstruction and automated exposure control. Radiation dose length   product (DLP) for this visit:  912 mGy-cm    IV Contrast:  50 mL of " iohexol (OMNIPAQUE) 100 mL of iohexol (OMNIPAQUE)  Enteric Contrast: Enteric contrast was administered.    FINDINGS:    ABDOMEN    LOWER CHEST: No clinically significant abnormality identified in the visualized lower chest.    LIVER/BILIARY TREE: One or more subcentimeter sharply circumscribed low-density hepatic lesion(s) are noted, too small to accurately characterize, but statistically most likely to represent subcentimeter hepatic cysts.  No suspicious solid hepatic lesion   is identified.  Hepatic contours are normal.  No biliary dilatation.    GALLBLADDER:  No calcified gallstones. No pericholecystic inflammatory change.    SPLEEN:  Unremarkable.    PANCREAS:  Unremarkable.    ADRENAL GLANDS:  Unremarkable.    KIDNEYS/URETERS: No hydronephrosis or urinary tract calculus. 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 One or more simple cysts and sharply circumscribed subcentimeter renal hypodensities are present, too small to accurately   characterize, and statistically most likely benign findings. According to recent literature (Radiology 2019) no further workup of these findings is recommended.    STOMACH AND BOWEL:  Unremarkable.    APPENDIX:  No findings to suggest appendicitis.    ABDOMINOPELVIC CAVITY:  No ascites.  No pneumoperitoneum.  No lymphadenopathy. Decreased size of a 7 x 6 mm left inguinal lymph node, previously previously 10 x 8 mm, as described on prior PET/CT.    VESSELS:  Atherosclerotic changes are present.  No evidence of aneurysm.    PELVIS    REPRODUCTIVE ORGANS:  Unremarkable for patient's age.    URINARY BLADDER:  Unremarkable.    ABDOMINAL WALL/INGUINAL REGIONS: Small bilateral fat-containing inguinal hernias.    OSSEOUS STRUCTURES: No acute fracture or destructive osseous lesion.  Spinal degenerative changes are noted. Unchanged tiny sclerotic focus in the right femoral neck, likely a bone island.  Impression: 1. No evidence of metastatic disease in the abdomen or  "pelvis.    2. Decreased size of a 7 x 6 mm left inguinal lymph node, previously described on prior PET/CT.    3. Scattered subcentimeter hepatic hypodensities, too small to definitively characterize, but statistically likely benign. However, given history of malignancy, attention on follow-up imaging is recommended.    Workstation performed: [id number]      LABS:  Lab data are reviewed and documented in HemOn history.       Lab Results   Component Value Date    HGB 13.9 02/01/2024    HCT 42.7 02/01/2024    MCV 89 02/01/2024     02/01/2024    WBC 6.37 02/01/2024    NRBC 0 02/01/2024     Lab Results   Component Value Date    K 4.7 02/01/2024     02/01/2024    CO2 27 02/01/2024    BUN 26 (H) 02/01/2024    CREATININE 0.88 02/01/2024    GLUF 112 (H) 02/01/2024    CALCIUM 9.5 02/01/2024    AST 18 02/01/2024    ALT 15 02/01/2024    ALKPHOS 89 02/01/2024    EGFR 82 02/01/2024       No results found for: \"IRON\", \"TIBC\", \"FERRITIN\"    No results found for: \"AEPPZWMO81\"    No results for input(s): \"WBC\", \"CREAT\", \"PLT\" in the last 72 hours.    By:  Francine Kemp MD, 2/21/2024, 10:04 AM                                  "

## 2024-02-21 ENCOUNTER — TELEPHONE (OUTPATIENT)
Dept: HEMATOLOGY ONCOLOGY | Facility: CLINIC | Age: 79
End: 2024-02-21

## 2024-02-21 ENCOUNTER — OFFICE VISIT (OUTPATIENT)
Dept: HEMATOLOGY ONCOLOGY | Facility: CLINIC | Age: 79
End: 2024-02-21
Payer: COMMERCIAL

## 2024-02-21 VITALS
OXYGEN SATURATION: 95 % | DIASTOLIC BLOOD PRESSURE: 74 MMHG | RESPIRATION RATE: 18 BRPM | HEIGHT: 69 IN | BODY MASS INDEX: 35.84 KG/M2 | TEMPERATURE: 96.9 F | WEIGHT: 242 LBS | SYSTOLIC BLOOD PRESSURE: 122 MMHG | HEART RATE: 57 BPM

## 2024-02-21 DIAGNOSIS — R97.21 RISING PSA FOLLOWING TREATMENT FOR MALIGNANT NEOPLASM OF PROSTATE: ICD-10-CM

## 2024-02-21 DIAGNOSIS — C61 PROSTATE CANCER (HCC): Primary | ICD-10-CM

## 2024-02-21 PROCEDURE — 99215 OFFICE O/P EST HI 40 MIN: CPT | Performed by: INTERNAL MEDICINE

## 2024-02-21 PROCEDURE — G2211 COMPLEX E/M VISIT ADD ON: HCPCS | Performed by: INTERNAL MEDICINE

## 2024-02-21 NOTE — TELEPHONE ENCOUNTER
Lvm with time and date of infusion appt, patient has my teams number to return my call and confirm appts.

## 2024-05-03 ENCOUNTER — APPOINTMENT (OUTPATIENT)
Dept: LAB | Facility: HOSPITAL | Age: 79
End: 2024-05-03
Payer: COMMERCIAL

## 2024-05-03 LAB
ALBUMIN SERPL BCP-MCNC: 4.3 G/DL (ref 3.5–5)
ALP SERPL-CCNC: 105 U/L (ref 34–104)
ALT SERPL W P-5'-P-CCNC: 16 U/L (ref 7–52)
ANION GAP SERPL CALCULATED.3IONS-SCNC: 7 MMOL/L (ref 4–13)
AST SERPL W P-5'-P-CCNC: 20 U/L (ref 13–39)
BASOPHILS # BLD AUTO: 0.03 THOUSANDS/ÂΜL (ref 0–0.1)
BASOPHILS NFR BLD AUTO: 1 % (ref 0–1)
BILIRUB SERPL-MCNC: 0.47 MG/DL (ref 0.2–1)
BUN SERPL-MCNC: 26 MG/DL (ref 5–25)
CALCIUM SERPL-MCNC: 9.1 MG/DL (ref 8.4–10.2)
CHLORIDE SERPL-SCNC: 107 MMOL/L (ref 96–108)
CO2 SERPL-SCNC: 24 MMOL/L (ref 21–32)
CREAT SERPL-MCNC: 0.75 MG/DL (ref 0.6–1.3)
EOSINOPHIL # BLD AUTO: 0.16 THOUSAND/ÂΜL (ref 0–0.61)
EOSINOPHIL NFR BLD AUTO: 3 % (ref 0–6)
ERYTHROCYTE [DISTWIDTH] IN BLOOD BY AUTOMATED COUNT: 14.2 % (ref 11.6–15.1)
GFR SERPL CREATININE-BSD FRML MDRD: 87 ML/MIN/1.73SQ M
GLUCOSE P FAST SERPL-MCNC: 117 MG/DL (ref 65–99)
HCT VFR BLD AUTO: 42.8 % (ref 36.5–49.3)
HGB BLD-MCNC: 13.8 G/DL (ref 12–17)
IMM GRANULOCYTES # BLD AUTO: 0.02 THOUSAND/UL (ref 0–0.2)
IMM GRANULOCYTES NFR BLD AUTO: 0 % (ref 0–2)
LYMPHOCYTES # BLD AUTO: 1.57 THOUSANDS/ÂΜL (ref 0.6–4.47)
LYMPHOCYTES NFR BLD AUTO: 30 % (ref 14–44)
MCH RBC QN AUTO: 28.4 PG (ref 26.8–34.3)
MCHC RBC AUTO-ENTMCNC: 32.2 G/DL (ref 31.4–37.4)
MCV RBC AUTO: 88 FL (ref 82–98)
MONOCYTES # BLD AUTO: 0.38 THOUSAND/ÂΜL (ref 0.17–1.22)
MONOCYTES NFR BLD AUTO: 7 % (ref 4–12)
NEUTROPHILS # BLD AUTO: 3.14 THOUSANDS/ÂΜL (ref 1.85–7.62)
NEUTS SEG NFR BLD AUTO: 59 % (ref 43–75)
NRBC BLD AUTO-RTO: 0 /100 WBCS
PLATELET # BLD AUTO: 252 THOUSANDS/UL (ref 149–390)
PMV BLD AUTO: 10 FL (ref 8.9–12.7)
POTASSIUM SERPL-SCNC: 4.1 MMOL/L (ref 3.5–5.3)
PROT SERPL-MCNC: 7.6 G/DL (ref 6.4–8.4)
PSA SERPL-MCNC: 1.13 NG/ML (ref 0–4)
RBC # BLD AUTO: 4.86 MILLION/UL (ref 3.88–5.62)
SODIUM SERPL-SCNC: 138 MMOL/L (ref 135–147)
WBC # BLD AUTO: 5.3 THOUSAND/UL (ref 4.31–10.16)

## 2024-05-14 ENCOUNTER — TELEPHONE (OUTPATIENT)
Dept: HEMATOLOGY ONCOLOGY | Facility: CLINIC | Age: 79
End: 2024-05-14

## 2024-05-14 ENCOUNTER — HOSPITAL ENCOUNTER (OUTPATIENT)
Dept: INFUSION CENTER | Facility: CLINIC | Age: 79
Discharge: HOME | End: 2024-05-14
Payer: COMMERCIAL

## 2024-05-14 ENCOUNTER — OFFICE VISIT (OUTPATIENT)
Dept: HEMATOLOGY ONCOLOGY | Facility: CLINIC | Age: 79
End: 2024-05-14
Payer: COMMERCIAL

## 2024-05-14 VITALS
RESPIRATION RATE: 18 BRPM | HEIGHT: 69 IN | OXYGEN SATURATION: 97 % | HEART RATE: 60 BPM | DIASTOLIC BLOOD PRESSURE: 72 MMHG | WEIGHT: 242 LBS | SYSTOLIC BLOOD PRESSURE: 122 MMHG | TEMPERATURE: 96.7 F | BODY MASS INDEX: 35.84 KG/M2

## 2024-05-14 DIAGNOSIS — R97.21 RISING PSA FOLLOWING TREATMENT FOR MALIGNANT NEOPLASM OF PROSTATE: ICD-10-CM

## 2024-05-14 DIAGNOSIS — R97.21 RISING PSA FOLLOWING TREATMENT FOR MALIGNANT NEOPLASM OF PROSTATE: Primary | ICD-10-CM

## 2024-05-14 DIAGNOSIS — C61 PROSTATE CANCER (HCC): ICD-10-CM

## 2024-05-14 DIAGNOSIS — C61 PROSTATE CANCER (HCC): Primary | ICD-10-CM

## 2024-05-14 PROCEDURE — G2211 COMPLEX E/M VISIT ADD ON: HCPCS | Performed by: INTERNAL MEDICINE

## 2024-05-14 PROCEDURE — 99215 OFFICE O/P EST HI 40 MIN: CPT | Performed by: INTERNAL MEDICINE

## 2024-05-14 RX ADMIN — LEUPROLIDE ACETATE 45 MG: 45 INJECTION, SUSPENSION, EXTENDED RELEASE SUBCUTANEOUS at 10:47

## 2024-05-14 NOTE — TELEPHONE ENCOUNTER
Please schedule infusion for patient. Patient requesting the same day as his follow up.      periodic alcohol use- last drank last week 2 coffee cups worth. Hx of binge drinking in high school

## 2024-05-14 NOTE — PROGRESS NOTES
Pt offers no new complaints, tolerated eligard to right upper arm sq without complications. Confirmed next appt 11-14-24 9:30 with provider and then infusion at 10:30, AVS provided.

## 2024-05-14 NOTE — PROGRESS NOTES
Hematology/Oncology Outpatient Office Note    Date of Service: 2024    [facility] [place]  Lexi [place] [id number]  [phone]    Reason for Consultation:   Chief Complaint   Patient presents with    Follow-up       Cancer Stage at diagnosis: II, interval metastatic recurrence    Referral Physician: No ref. provider found    Primary Care Physician:  No primary care provider on file.     Nickname: Yung    Spouse: Rafael    Original ECO    Today's ECO    Goals and Barriers:  Current Goal: Minimize effects of disease burden, extend life.   Barriers to accomplishing this: None    Patient's Capacity to Self Care:  Patient is able to self care    Code Status: not addressed today    Advanced directives: not addressed today    ASSESSMENT & PLAN      Diagnosis ICD-10-CM Associated Orders   1. Prostate cancer (HCC)  C61             This is a 78 y.o. c PMHx notable for Lyme disease, being seen in consultation for recurrent metastatic prostate cancer    Pt was diagnosed 3/2009 with cT2N0, G7 prostate cancer s/p EBRT. PSA was noted to be rising 2018. Imaging showed inguinal LN involvement and was started on Eligard and gets it q 6 months.    Discussion of decision making  Oncology history updated, accordingly, during this visit  Goals of care/patient communication  I discussed with the patient the clinical course leading up to their cancer diagnosis. I reviewed relevant office notes, imaging reports and pathology result as well.  I told the patient that this is a case incurable disease and what this means. We discussed that the goal of anti-cancer therapy is to provide best quality of life, extend overall survival, and progression free survival as shown in clinical trials. We also discussed that there might be a point when the cancer will no longer respond to this anti-neoplastic therapy.   I explained the risks/benefits of the proposed  cancer therapy: Eligard and after discussion including understanding risks of possible life-threatening complications and therapy-related malignancy development, informed consent for blood products and treatment has been signed and obtained.  TNM/Staging At Diagnosis  cT2 N0 M0 Berna score 7 prostate cancer  Cancer Staging   II  Disease Features/Tumor Markers/Genetics  Tumor Marker: PSA  10/31/2022: 0.4  4/27/2023:   0.7  11/6/2023:   0.87  2/1/2024:     0.96  5/3/2024:     1.13  Notable Path Features: G7  Treatment: Eligard  Other Supportive care:   Treatment Team Members  Surgeon  Rad Onc: Dr. Brand  Palliative  Labs  Diagnostics  10/5/2020 PET/CT Axumin: Heterogeneous radiotracer uptake at the prostate gland suspicious for disease recurrence. A small left inguinal lymph node with Axumin activity greater than blood pool raises suspicion for metastasis  9/11/2023 NM Bone scan: No scintigraphic evidence of osseous metastasis  CT Abd/pelv w/c: No evidence of metastatic disease in the abdomen or pelvis. Decreased size of a 7 x 6 mm left inguinal lymph node, previously described on prior PET/CT    PSA doubling rate is >1 year    Discussion of decision making    I personally reviewed the following lab results, the image studies, pathology, other specialty/physicians consult notes and recommendations, and outside medical records. I had a lengthy discussion with the patient and shared the work-up findings. We discussed the diagnosis and management plan as below. I spent 42 minutes reviewing the records (labs, clinician notes, outside records, medical history, ordering medicine/tests/procedures, interpreting the imaging/labs previously done) and coordination of care as well as direct time with the patient today, of which greater than 50% of the time was spent in counseling and coordination of care with the patient/family.    Plan/Labs  -Cont q6 months Eligard   -F/u Urology  NO role for oral ADT at this time, watching  the PSA doubling time trend closely  -CBC, CMP, PSA in 6 months ordered       Follow Up: 6 months    All questions were answered to the patient's satisfaction during this encounter. The patient knows the contact information for our office and knows to reach out for any relevant concerns related to this encounter. They are to call for any temperature 100.4 or higher, new symptoms including but not restricted to shaking chills, decreased appetite, nausea, vomiting, diarrhea, increased fatigue, shortness of breath or chest pain, confusion, and not feeling the strength to come to the clinic. For all other listed problems and medical diagnosis in their chart - they are managed by PCP and/or other specialists, which the patient acknowledges. Thank you very much for your consultation and making us a part of this patient's care. We are continuing to follow closely with you. Please do not hesitate to reach out to me with any additional questions or concerns.    Francine Kemp MD  Hematology & Medical Oncology Staff Physician             Disclaimer: This document was prepared using Powered Outcomes Direct technology. If a word or phrase is confusing, or does not make sense, this is likely due to recognition error which was not discovered during this clinician's review. If you believe an error has occurred, please contact me through [facility] service line for shandra?cation.      ONCOLOGY HISTORY OF PRESENT ILLNESS        Oncology History Overview Note   77 y.o. year old male completed definitive radiation therapy for Farwell score 6 stage II prostate adenocarcinoma on 4/1/2009  with [facility]Therapy at [facility].  PET scan 10/5/20  was consistent with recurrent disease in the prostate and a small left inguinal lymph node that is suspicious.  He is currently on Casodex and Eligard.    He was last seen in radiation oncology on 11/17/21 and returns for his annual follow-up    Lab Results   Component Value Date    PSA 0.4  04/22/2022    PSA 3.8 09/04/2020    PSA 2.3 09/03/2019 5/5/22  Rocio  Clinically stable, PSA remains suppressed.  Recommend cont. Eligard.  F/U in 6  months with PSA    11/7/22  Rocio Mitchell  CHECK NOTE       Prostate cancer (HCC)   11/25/2008 Initial Diagnosis    Prostate cancer (HCC)  - pretreatment PSA was 4.2 NG/ML.      11/25/2008 Biopsy    Prostate biopsy: adenocarcinoma, primarily [place] score 3+3=6 with one focus within the left lateral mid gland with Berna score 3+4=7.    - Dr Andrew Bell     11/25/2008 -  Cancer Staged    Stage II, T2 N0 M0, [place] 7     2/2/2009 - 4/1/2009 Radiation    TomoTherapy - site: prostate. Dose 7560 cGy at [facility]   - Dr Brand     11/13/2020 -  Hormone Therapy    30 days of Casodex, Lupron injection           SUBJECTIVE  (INTERVAL HISTORY)      Clotting History None   Bleeding History None   Cancer History Prostate   Family Cancer History None   H/O Blood/Plt Transfusion None   Tobacco/etoh Remote smoker 1/4 PPD 30 years, drinks 4 cans of beer 2-3X x week   Cancer Screening history N/a   Occupation  (still works)     Pain: none    He still gets several daily hot flashes, daytime sweating on Lupron that doesn't bother him. Mild, intermittent LH.  I have reviewed the relevant past medical, surgical, social and family history. I have also reviewed allergies and medications for this patient.    Review of Systems  Baseline weight: 230 lbs    Denies F/C, N/V, SOB, CP, HA, rash, itching, gen weakness, melena, hematuria, hematochezia, falls, diarrhea, or constipation     A 10-point review of system was performed, pertinent positive and negative were detailed as above. Otherwise, the 10-point review of system was negative.      Past Medical History:   Diagnosis Date    Cancer (HCC)     Prostate Cancer    Lyme disease     Prostate cancer (HCC) 11/25/2008       Past Surgical History:   Procedure Laterality Date    COLONOSCOPY      PROSTATE  BIOPSY  2008    TONSILLECTOMY AND ADENOIDECTOMY      age 18       Family History   Problem Relation Age of Onset    Rectal cancer Father     Colon cancer Paternal Grandfather        Social History     Socioeconomic History    Marital status: /Civil Union     Spouse name: Not on file    Number of children: Not on file    Years of education: Not on file    Highest education level: Not on file   Occupational History    Not on file   Tobacco Use    Smoking status: Former     Current packs/day: 0.00     Types: Cigarettes     Quit date:      Years since quittin.3    Smokeless tobacco: Never   Vaping Use    Vaping status: Never Used   Substance and Sexual Activity    Alcohol use: Yes     Alcohol/week: 8.0 standard drinks of alcohol     Types: 8 Standard drinks or equivalent per week    Drug use: No    Sexual activity: Not on file   Other Topics Concern    Not on file   Social History Narrative    Not on file     Social Determinants of Health     Financial Resource Strain: Not on file   Food Insecurity: Not on file   Transportation Needs: Not on file   Physical Activity: Not on file   Stress: Not on file   Social Connections: Not on file   Intimate Partner Violence: Not on file   Housing Stability: Not on file       Allergies   Allergen Reactions    Erythromycin Base Nausea Only    Penicillin G Hives    Penicillins Hives       Current Outpatient Medications   Medication Sig Dispense Refill    ascorbic acid (VITAMIN C) 250 mg tablet Take 250 mg by mouth daily      aspirin 81 mg chewable tablet Chew 1 tablet Daily      Cholecalciferol 2000 units CAPS Take 1 capsule by mouth daily      DAILY MULTIPLE VITAMINS PO Take 1 tablet by mouth daily      fluticasone (FLOVENT HFA) 110 MCG/ACT inhaler Inhale 2 puffs 2 (two) times a day Rinse mouth after use.      glucosamine-chondroitin 500-400 MG tablet Take 1 tablet by mouth daily      lisinopril (ZESTRIL) 20 mg tablet Take 20 mg by mouth daily Takes 1/2 tab       "Methylprednisolone 4 MG TBPK Use as directed on package 21 tablet 0    OXYCODONE HCL PO Take by mouth Prn after knee surgery, unsure of dose      rosuvastatin (CRESTOR) 20 MG tablet Take 1 tablet by mouth daily      zinc gluconate 50 mg tablet Take 50 mg by mouth daily       No current facility-administered medications for this visit.       (Not in a hospital admission)      Objective:     24 Hour Vitals Assessment:     Vitals:    05/14/24 0956   BP: 122/72   Pulse: 60   Resp: 18   Temp: (!) 96.7 °F (35.9 °C)   SpO2: 97%           PHYSICIAN EXAM:    General: Appearance: alert, cooperative, no distress.  HEENT: Normocephalic, atraumatic. No scleral icterus. conjunctivae clear. EOMI.  Chest: No tenderness to palpation. No open wound noted.  Lungs: Clear to auscultation bilaterally, Respirations unlabored.  Cardiac: Regular rate (borderline) , +S1and S2  Abdomen: Soft, non-tender, non-distended. Bowel sounds are normal.  Extremities:  No edema, cyanosis, clubbing.  Skin: Skin color, turgor are normal. No rashes.  Lymphatics: no palpable supra-cervical, axillary, or inguinal adenopathy  Neurologic: Awake, Alert, and oriented, no gross focal deficits noted b/l.       DATA REVIEW:    Pathology Result:    No results found for: \"FINALDX\"     Image Results:   Image result are reviewed and documented in Hematology/Oncology history    CT abdomen pelvis w contrast  Narrative: CT ABDOMEN AND PELVIS WITH IV CONTRAST    INDICATION:   C61: Malignant neoplasm of prostate.    COMPARISON: PET/CT 74181 20.    TECHNIQUE:  CT examination of the abdomen and pelvis was performed. Multiplanar 2D reformatted images were created from the source data.    This examination, like all CT scans performed in the [facility], was performed utilizing techniques to minimize radiation dose exposure, including the use of iterative reconstruction and automated exposure control. Radiation dose length   product (DLP) for this visit:  912 " mGy-cm    IV Contrast:  50 mL of iohexol (OMNIPAQUE) 100 mL of iohexol (OMNIPAQUE)  Enteric Contrast: Enteric contrast was administered.    FINDINGS:    ABDOMEN    LOWER CHEST: No clinically significant abnormality identified in the visualized lower chest.    LIVER/BILIARY TREE: One or more subcentimeter sharply circumscribed low-density hepatic lesion(s) are noted, too small to accurately characterize, but statistically most likely to represent subcentimeter hepatic cysts.  No suspicious solid hepatic lesion   is identified.  Hepatic contours are normal.  No biliary dilatation.    GALLBLADDER:  No calcified gallstones. No pericholecystic inflammatory change.    SPLEEN:  Unremarkable.    PANCREAS:  Unremarkable.    ADRENAL GLANDS:  Unremarkable.    KIDNEYS/URETERS: No hydronephrosis or urinary tract calculus. 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 One or more simple cysts and sharply circumscribed subcentimeter renal hypodensities are present, too small to accurately   characterize, and statistically most likely benign findings. According to recent literature (Radiology 2019) no further workup of these findings is recommended.    STOMACH AND BOWEL:  Unremarkable.    APPENDIX:  No findings to suggest appendicitis.    ABDOMINOPELVIC CAVITY:  No ascites.  No pneumoperitoneum.  No lymphadenopathy. Decreased size of a 7 x 6 mm left inguinal lymph node, previously previously 10 x 8 mm, as described on prior PET/CT.    VESSELS:  Atherosclerotic changes are present.  No evidence of aneurysm.    PELVIS    REPRODUCTIVE ORGANS:  Unremarkable for patient's age.    URINARY BLADDER:  Unremarkable.    ABDOMINAL WALL/INGUINAL REGIONS: Small bilateral fat-containing inguinal hernias.    OSSEOUS STRUCTURES: No acute fracture or destructive osseous lesion.  Spinal degenerative changes are noted. Unchanged tiny sclerotic focus in the right femoral neck, likely a bone island.  Impression: 1. No evidence of  "metastatic disease in the abdomen or pelvis.    2. Decreased size of a 7 x 6 mm left inguinal lymph node, previously described on prior PET/CT.    3. Scattered subcentimeter hepatic hypodensities, too small to definitively characterize, but statistically likely benign. However, given history of malignancy, attention on follow-up imaging is recommended.    Workstation performed: [id number]      LABS:  [facility]       Lab Results   Component Value Date    HGB 13.8 05/03/2024    HCT 42.8 05/03/2024    MCV 88 05/03/2024     05/03/2024    WBC 5.30 05/03/2024    NRBC 0 05/03/2024     Lab Results   Component Value Date    K 4.1 05/03/2024     05/03/2024    CO2 24 05/03/2024    BUN 26 (H) 05/03/2024    CREATININE 0.75 05/03/2024    GLUF 117 (H) 05/03/2024    CALCIUM 9.1 05/03/2024    AST 20 05/03/2024    ALT 16 05/03/2024    ALKPHOS 105 (H) 05/03/2024    EGFR 87 05/03/2024       No results found for: \"IRON\", \"TIBC\", \"FERRITIN\"    No results found for: \"[id number]\"    No results for input(s): \"WBC\", \"CREAT\", \"PLT\" in the last 72 hours.    By:  Binta Foote MD, 5/14/2024, 10:21 AM                                  "

## 2024-11-04 ENCOUNTER — APPOINTMENT (OUTPATIENT)
Dept: LAB | Facility: HOSPITAL | Age: 79
End: 2024-11-04
Payer: COMMERCIAL

## 2024-11-04 DIAGNOSIS — C61 PROSTATE CANCER (HCC): ICD-10-CM

## 2024-11-04 LAB
ALBUMIN SERPL BCG-MCNC: 4.2 G/DL (ref 3.5–5)
ALP SERPL-CCNC: 102 U/L (ref 34–104)
ALT SERPL W P-5'-P-CCNC: 22 U/L (ref 7–52)
ANION GAP SERPL CALCULATED.3IONS-SCNC: 7 MMOL/L (ref 4–13)
AST SERPL W P-5'-P-CCNC: 22 U/L (ref 13–39)
BASOPHILS # BLD AUTO: 0.03 THOUSANDS/ΜL (ref 0–0.1)
BASOPHILS NFR BLD AUTO: 1 % (ref 0–1)
BILIRUB SERPL-MCNC: 0.46 MG/DL (ref 0.2–1)
BUN SERPL-MCNC: 20 MG/DL (ref 5–25)
CALCIUM SERPL-MCNC: 9.1 MG/DL (ref 8.4–10.2)
CHLORIDE SERPL-SCNC: 105 MMOL/L (ref 96–108)
CO2 SERPL-SCNC: 28 MMOL/L (ref 21–32)
CREAT SERPL-MCNC: 0.79 MG/DL (ref 0.6–1.3)
EOSINOPHIL # BLD AUTO: 0.15 THOUSAND/ΜL (ref 0–0.61)
EOSINOPHIL NFR BLD AUTO: 3 % (ref 0–6)
ERYTHROCYTE [DISTWIDTH] IN BLOOD BY AUTOMATED COUNT: 14.1 % (ref 11.6–15.1)
GFR SERPL CREATININE-BSD FRML MDRD: 85 ML/MIN/1.73SQ M
GLUCOSE P FAST SERPL-MCNC: 105 MG/DL (ref 65–99)
HCT VFR BLD AUTO: 42.2 % (ref 36.5–49.3)
HGB BLD-MCNC: 13.4 G/DL (ref 12–17)
IMM GRANULOCYTES # BLD AUTO: 0.01 THOUSAND/UL (ref 0–0.2)
IMM GRANULOCYTES NFR BLD AUTO: 0 % (ref 0–2)
LYMPHOCYTES # BLD AUTO: 1.65 THOUSANDS/ΜL (ref 0.6–4.47)
LYMPHOCYTES NFR BLD AUTO: 29 % (ref 14–44)
MCH RBC QN AUTO: 28.2 PG (ref 26.8–34.3)
MCHC RBC AUTO-ENTMCNC: 31.8 G/DL (ref 31.4–37.4)
MCV RBC AUTO: 89 FL (ref 82–98)
MONOCYTES # BLD AUTO: 0.4 THOUSAND/ΜL (ref 0.17–1.22)
MONOCYTES NFR BLD AUTO: 7 % (ref 4–12)
NEUTROPHILS # BLD AUTO: 3.39 THOUSANDS/ΜL (ref 1.85–7.62)
NEUTS SEG NFR BLD AUTO: 60 % (ref 43–75)
NRBC BLD AUTO-RTO: 0 /100 WBCS
PLATELET # BLD AUTO: 253 THOUSANDS/UL (ref 149–390)
PMV BLD AUTO: 10.1 FL (ref 8.9–12.7)
POTASSIUM SERPL-SCNC: 4.6 MMOL/L (ref 3.5–5.3)
PROT SERPL-MCNC: 7.1 G/DL (ref 6.4–8.4)
PSA SERPL-MCNC: 1.26 NG/ML (ref 0–4)
RBC # BLD AUTO: 4.75 MILLION/UL (ref 3.88–5.62)
SODIUM SERPL-SCNC: 140 MMOL/L (ref 135–147)
WBC # BLD AUTO: 5.63 THOUSAND/UL (ref 4.31–10.16)

## 2024-11-04 PROCEDURE — 80053 COMPREHEN METABOLIC PANEL: CPT

## 2024-11-04 PROCEDURE — 85025 COMPLETE CBC W/AUTO DIFF WBC: CPT

## 2024-11-04 PROCEDURE — 36415 COLL VENOUS BLD VENIPUNCTURE: CPT

## 2024-11-04 PROCEDURE — 84153 ASSAY OF PSA TOTAL: CPT

## 2024-11-04 NOTE — PROGRESS NOTES
Hematology/Oncology Outpatient Office Note    Date of Service: 2024    [facility]  [address]  [place] [id number]  [phone]    Reason for Consultation:   Chief Complaint   Patient presents with    Follow-up       Cancer Stage at diagnosis: II, interval metastatic recurrence    Referral Physician: No ref. provider found    Primary Care Physician:  No primary care provider on file.     Nickname: Yung    Spouse: Rafael    Original ECO    Today's ECO    Goals and Barriers:  Current Goal: Minimize effects of disease burden, extend life.   Barriers to accomplishing this: None    Patient's Capacity to Self Care:  Patient is able to self care    Code Status: not addressed today    Advanced directives: not addressed today    ASSESSMENT & PLAN      Diagnosis ICD-10-CM Associated Orders   1. Prostate cancer (HCC)  C61             This is a 79 y.o. c PMHx notable for Lyme disease, being seen in consultation for recurrent metastatic prostate cancer    Pt was diagnosed 3/2009 with cT2N0, G7 prostate cancer s/p EBRT. PSA was noted to be rising 2018. Imaging showed inguinal LN involvement and was started on Eligard and gets it q 6 months.    Discussion of decision making  Oncology history updated, accordingly, during this visit  Goals of care/patient communication  I discussed with the patient the clinical course leading up to their cancer diagnosis. I reviewed relevant office notes, imaging reports and pathology result as well.  I told the patient that this is a case incurable disease and what this means. We discussed that the goal of anti-cancer therapy is to provide best quality of life, extend overall survival, and progression free survival as shown in clinical trials. We also discussed that there might be a point when the cancer will no longer respond to this anti-neoplastic therapy.   I explained the risks/benefits of the proposed  cancer therapy: Eligard and after discussion including understanding risks of possible life-threatening complications and therapy-related malignancy development, informed consent for blood products and treatment has been signed and obtained.  TNM/Staging At Diagnosis  cT2 N0 M0 [place] score 7 prostate cancer  Cancer Staging   II  Disease Features/Tumor Markers/Genetics  Tumor Marker: PSA  10/31/2022: 0.4  4/27/2023:   0.7  11/6/2023:   0.87  2/1/2024:     0.96  5/3/2024:     1.13  11/4/2024: 1.256   Notable Path Features: G7  Treatment: Eligard  Other Supportive care:   Treatment Team Members  Surgeon  Rad Onc: Dr. Brand  Palliative  Labs  Diagnostics  10/5/2020 PET/CT Axumin: Heterogeneous radiotracer uptake at the prostate gland suspicious for disease recurrence. A small left inguinal lymph node with Axumin activity greater than blood pool raises suspicion for metastasis  9/11/2023 NM Bone scan: No scintigraphic evidence of osseous metastasis  CT Abd/pelv w/c: No evidence of metastatic disease in the abdomen or pelvis. Decreased size of a 7 x 6 mm left inguinal lymph node, previously described on prior PET/CT    PSA doubling rate is >1 year    Discussion of decision making    I personally reviewed the following lab results, the image studies, pathology, other specialty/physicians consult notes and recommendations, and outside medical records. I had a lengthy discussion with the patient and shared the work-up findings. We discussed the diagnosis and management plan as below. I spent 43 minutes reviewing the records (labs, clinician notes, outside records, medical history, ordering medicine/tests/procedures, interpreting the imaging/labs previously done) and coordination of care as well as direct time with the patient today, of which greater than 50% of the time was spent in counseling and coordination of care with the patient/family.    Plan/Labs  -Cont q6 months Eligard   -F/u Urology  NO role for oral ADT at  this time, watching the PSA doubling time trend closely  -CBC, CMP, in 1 month ordered, PSA due 2/2205   PET/CT PSMA   Testosterone being ordered    Follow Up: 4 weeks    All questions were answered to the patient's satisfaction during this encounter. The patient knows the contact information for our office and knows to reach out for any relevant concerns related to this encounter. They are to call for any temperature 100.4 or higher, new symptoms including but not restricted to shaking chills, decreased appetite, nausea, vomiting, diarrhea, increased fatigue, shortness of breath or chest pain, confusion, and not feeling the strength to come to the clinic. For all other listed problems and medical diagnosis in their chart - they are managed by PCP and/or other specialists, which the patient acknowledges. Thank you very much for your consultation and making us a part of this patient's care. We are continuing to follow closely with you. Please do not hesitate to reach out to me with any additional questions or concerns.    Francine Kemp MD  Hematology & Medical Oncology Staff Physician             Disclaimer: This document was prepared using [employer] technology. If a word or phrase is confusing, or does not make sense, this is likely due to recognition error which was not discovered during this clinician's review. If you believe an error has occurred, please contact me through [employer] service line for [email or web address].      ONCOLOGY HISTORY OF PRESENT ILLNESS        Oncology History Overview Note   77 y.o. year old male completed definitive radiation therapy for Berna score 6 stage II prostate adenocarcinoma on 4/1/2009  with TomoTherapy at [facility].  PET scan 10/5/20  was consistent with recurrent disease in the prostate and a small left inguinal lymph node that is suspicious.  He is currently on Casodex and Eligard.    He was last seen in radiation oncology on 11/17/21 and returns for his  annual follow-up    Lab Results   Component Value Date    PSA 0.4 04/22/2022    PSA 3.8 09/04/2020    PSA 2.3 09/03/2019 5/5/22  Rocio  Clinically stable, PSA remains suppressed.  Recommend cont. Eligard.  F/U in 6  months with PSA    11/7/22  Rocio Mitchell  CHECK NOTE       Prostate cancer (HCC)   11/25/2008 Initial Diagnosis    Prostate cancer (HCC)  - pretreatment PSA was 4.2 NG/ML.      11/25/2008 Biopsy    Prostate biopsy: adenocarcinoma, primarily Berna score 3+3=6 with one focus within the left lateral mid gland with [place] score 3+4=7.    - Dr Andrew Bell     11/25/2008 -  Cancer Staged    Stage II, T2 N0 M0, Berna 7     2/2/2009 - 4/1/2009 Radiation    TomoTherapy - site: prostate. Dose 7560 cGy at [facility]   - Dr Brand     11/13/2020 -  Hormone Therapy    30 days of Casodex, Lupron injection           SUBJECTIVE  (INTERVAL HISTORY)      Clotting History None   Bleeding History None   Cancer History Prostate   Family Cancer History None   H/O Blood/Plt Transfusion None   Tobacco/etoh Remote smoker 1/4 PPD 30 years, drinks 4 cans of beer 2-3 X x week   Cancer Screening history N/a   Occupation  (still works)     Pain: none    He still gets several daily hot flashes, daytime sweating on Lupron that doesn't bother him. Mild, intermittent LH.  I have reviewed the relevant past medical, surgical, social and family history. I have also reviewed allergies and medications for this patient.    Review of Systems  Baseline weight: 230 lbs    Denies F/C, N/V, SOB, CP, HA, rash, itching, gen weakness, melena, hematuria, hematochezia, falls, diarrhea, or constipation     A 10-point review of system was performed, pertinent positive and negative were detailed as above. Otherwise, the 10-point review of system was negative.      Past Medical History:   Diagnosis Date    Cancer (HCC)     Prostate Cancer    Lyme disease     Prostate cancer (HCC) 11/25/2008       Past Surgical  History:   Procedure Laterality Date    COLONOSCOPY      PROSTATE BIOPSY  2008    TONSILLECTOMY AND ADENOIDECTOMY      age 18       Family History   Problem Relation Age of Onset    Rectal cancer Father     Colon cancer Paternal Grandfather        Social History     Socioeconomic History    Marital status: /Civil Union     Spouse name: Not on file    Number of children: Not on file    Years of education: Not on file    Highest education level: Not on file   Occupational History    Not on file   Tobacco Use    Smoking status: Former     Current packs/day: 0.00     Types: Cigarettes     Quit date:      Years since quittin.8     Passive exposure: Past    Smokeless tobacco: Never   Vaping Use    Vaping status: Never Used   Substance and Sexual Activity    Alcohol use: Yes     Alcohol/week: 8.0 standard drinks of alcohol     Types: 8 Standard drinks or equivalent per week    Drug use: No    Sexual activity: Not on file   Other Topics Concern    Not on file   Social History Narrative    Not on file     Social Drivers of Health     Financial Resource Strain: Not on file   Food Insecurity: Not on file   Transportation Needs: Not on file   Physical Activity: Not on file   Stress: Not on file   Social Connections: Unknown (2024)    Received from [employer]    Social Connections     How often do you feel lonely or isolated from those around you? (Adult - for ages 18 years and over): Not on file   Intimate Partner Violence: Not on file   Housing Stability: Not on file       Allergies   Allergen Reactions    Erythromycin Base Nausea Only    Penicillin G Hives    Penicillins Hives       Current Outpatient Medications   Medication Sig Dispense Refill    ascorbic acid (VITAMIN C) 250 mg tablet Take 250 mg by mouth daily      aspirin 81 mg chewable tablet Chew 1 tablet Daily      Cholecalciferol 2000 units CAPS Take 1 capsule by mouth daily      DAILY MULTIPLE VITAMINS PO Take 1 tablet by mouth daily       "fluticasone (FLOVENT HFA) 110 MCG/ACT inhaler Inhale 2 puffs 2 (two) times a day Rinse mouth after use.      glucosamine-chondroitin 500-400 MG tablet Take 1 tablet by mouth daily      lisinopril (ZESTRIL) 20 mg tablet Take 20 mg by mouth daily Takes 1/2 tab      Methylprednisolone 4 MG TBPK Use as directed on package 21 tablet 0    OXYCODONE HCL PO Take by mouth Prn after knee surgery, unsure of dose      rosuvastatin (CRESTOR) 20 MG tablet Take 1 tablet by mouth daily      zinc gluconate 50 mg tablet Take 50 mg by mouth daily       No current facility-administered medications for this visit.       (Not in a hospital admission)      Objective:     24 Hour Vitals Assessment:     Vitals:    11/14/24 0937   BP: 124/80   Pulse: 61   Resp: 18   Temp: (!) 97.4 °F (36.3 °C)   SpO2: 98%             PHYSICIAN EXAM:    General: Appearance: alert, cooperative, no distress.  HEENT: Normocephalic, atraumatic. No scleral icterus. conjunctivae clear. EOMI.  Chest: No tenderness to palpation. No open wound noted.  Lungs: Clear to auscultation bilaterally, Respirations unlabored.  Cardiac: Regular rate (borderline) , +S1and S2  Abdomen: Soft, non-tender, non-distended. Bowel sounds are normal.  Extremities:  No edema, cyanosis, clubbing.  Skin: Skin color, turgor are normal. No rashes.  Lymphatics: no palpable supra-cervical, axillary, or inguinal adenopathy  Neurologic: Awake, Alert, and oriented, no gross focal deficits noted b/l.       DATA REVIEW:    Pathology Result:    No results found for: \"FINALDX\"     Image Results:   Image result are reviewed and documented in Hematology/Oncology history    CT abdomen pelvis w contrast  Narrative: CT ABDOMEN AND PELVIS WITH IV CONTRAST    INDICATION:   C61: Malignant neoplasm of prostate.    COMPARISON: PET/CT 38398 20.    TECHNIQUE:  CT examination of the abdomen and pelvis was performed. Multiplanar 2D reformatted images were created from the source data.    This examination, like all " CT scans performed in the [facility], was performed utilizing techniques to minimize radiation dose exposure, including the use of iterative reconstruction and automated exposure control. Radiation dose length   product (DLP) for this visit:  912 mGy-cm    IV Contrast:  50 mL of iohexol (OMNIPAQUE) 100 mL of iohexol (OMNIPAQUE)  Enteric Contrast: Enteric contrast was administered.    FINDINGS:    ABDOMEN    LOWER CHEST: No clinically significant abnormality identified in the visualized lower chest.    LIVER/BILIARY TREE: One or more subcentimeter sharply circumscribed low-density hepatic lesion(s) are noted, too small to accurately characterize, but statistically most likely to represent subcentimeter hepatic cysts.  No suspicious solid hepatic lesion   is identified.  Hepatic contours are normal.  No biliary dilatation.    GALLBLADDER:  No calcified gallstones. No pericholecystic inflammatory change.    SPLEEN:  Unremarkable.    PANCREAS:  Unremarkable.    ADRENAL GLANDS:  Unremarkable.    KIDNEYS/URETERS: No hydronephrosis or urinary tract calculus. 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 One or more simple cysts and sharply circumscribed subcentimeter renal hypodensities are present, too small to accurately   characterize, and statistically most likely benign findings. According to recent literature (Radiology 2019) no further workup of these findings is recommended.    STOMACH AND BOWEL:  Unremarkable.    APPENDIX:  No findings to suggest appendicitis.    ABDOMINOPELVIC CAVITY:  No ascites.  No pneumoperitoneum.  No lymphadenopathy. Decreased size of a 7 x 6 mm left inguinal lymph node, previously previously 10 x 8 mm, as described on prior PET/CT.    VESSELS:  Atherosclerotic changes are present.  No evidence of aneurysm.    PELVIS    REPRODUCTIVE ORGANS:  Unremarkable for patient's age.    URINARY BLADDER:  Unremarkable.    ABDOMINAL WALL/INGUINAL REGIONS: Small bilateral  "fat-containing inguinal hernias.    OSSEOUS STRUCTURES: No acute fracture or destructive osseous lesion.  Spinal degenerative changes are noted. Unchanged tiny sclerotic focus in the right femoral neck, likely a bone island.  Impression: 1. No evidence of metastatic disease in the abdomen or pelvis.    2. Decreased size of a 7 x 6 mm left inguinal lymph node, previously described on prior PET/CT.    3. Scattered subcentimeter hepatic hypodensities, too small to definitively characterize, but statistically likely benign. However, given history of malignancy, attention on follow-up imaging is recommended.    Workstation performed: [id number]      LABS:  Lab data are reviewed and documented in HemOnc history.       Lab Results   Component Value Date    HGB 13.4 11/04/2024    HCT 42.2 11/04/2024    MCV 89 11/04/2024     11/04/2024    WBC 5.63 11/04/2024    NRBC 0 11/04/2024     Lab Results   Component Value Date    K 4.6 11/04/2024     11/04/2024    CO2 28 11/04/2024    BUN 20 11/04/2024    CREATININE 0.79 11/04/2024    GLUF 105 (H) 11/04/2024    CALCIUM 9.1 11/04/2024    AST 22 11/04/2024    ALT 22 11/04/2024    ALKPHOS 102 11/04/2024    EGFR 85 11/04/2024       No results found for: \"IRON\", \"TIBC\", \"FERRITIN\"    No results found for: \"GEPZGFTL31\"    No results for input(s): \"WBC\", \"CREAT\", \"PLT\" in the last 72 hours.    By:  Francine Kemp MD, 11/14/2024, 9:41 AM                                  "

## 2024-11-14 ENCOUNTER — HOSPITAL ENCOUNTER (OUTPATIENT)
Dept: INFUSION CENTER | Facility: CLINIC | Age: 79
Discharge: HOME | End: 2024-11-14
Payer: COMMERCIAL

## 2024-11-14 ENCOUNTER — TELEPHONE (OUTPATIENT)
Dept: HEMATOLOGY ONCOLOGY | Facility: CLINIC | Age: 79
End: 2024-11-14

## 2024-11-14 ENCOUNTER — OFFICE VISIT (OUTPATIENT)
Dept: HEMATOLOGY ONCOLOGY | Facility: CLINIC | Age: 79
End: 2024-11-14
Payer: COMMERCIAL

## 2024-11-14 VITALS
HEART RATE: 61 BPM | RESPIRATION RATE: 18 BRPM | TEMPERATURE: 97.4 F | WEIGHT: 244 LBS | OXYGEN SATURATION: 98 % | BODY MASS INDEX: 36.14 KG/M2 | SYSTOLIC BLOOD PRESSURE: 124 MMHG | HEIGHT: 69 IN | DIASTOLIC BLOOD PRESSURE: 80 MMHG

## 2024-11-14 DIAGNOSIS — C61 PROSTATE CANCER (HCC): Primary | ICD-10-CM

## 2024-11-14 DIAGNOSIS — R97.21 RISING PSA FOLLOWING TREATMENT FOR MALIGNANT NEOPLASM OF PROSTATE: Primary | ICD-10-CM

## 2024-11-14 DIAGNOSIS — C61 PROSTATE CANCER (HCC): ICD-10-CM

## 2024-11-14 PROCEDURE — G2211 COMPLEX E/M VISIT ADD ON: HCPCS | Performed by: INTERNAL MEDICINE

## 2024-11-14 PROCEDURE — 99215 OFFICE O/P EST HI 40 MIN: CPT | Performed by: INTERNAL MEDICINE

## 2024-11-14 PROCEDURE — 96402 CHEMO HORMON ANTINEOPL SQ/IM: CPT

## 2024-11-14 RX ADMIN — LEUPROLIDE ACETATE 45 MG: 45 INJECTION, SUSPENSION, EXTENDED RELEASE SUBCUTANEOUS at 10:34

## 2024-11-14 NOTE — PROGRESS NOTES
Pt offers no new complaints, he was just seen by Dr Paredes at office visit. Pt tolerated eligard to left upper arm sq without complications. Confirmed next appt 5/13/25 10am AVS declined.

## 2024-12-01 NOTE — PROGRESS NOTES
Hematology/Oncology Outpatient Office Note    Date of Service: 2024    [facility]  [address]  [place] [id number]  [phone]    Reason for Consultation:   Chief Complaint   Patient presents with    Follow-up       Cancer Stage at diagnosis: II, interval metastatic recurrence    Referral Physician: No ref. provider found    Primary Care Physician:  No primary care provider on file.     Nickname: Yung    Spouse: Rafael    Original ECO    Today's ECO    Goals and Barriers:  Current Goal: Minimize effects of disease burden, extend life.   Barriers to accomplishing this: None    Patient's Capacity to Self Care:  Patient is able to self care    Code Status: not addressed today    Advanced directives: not addressed today    ASSESSMENT & PLAN      Diagnosis ICD-10-CM Associated Orders   1. Prostate cancer (HCC)  C61             This is a 79 y.o. c PMHx notable for Lyme disease, being seen in consultation for recurrent metastatic prostate cancer    Pt was diagnosed 3/2009 with cT2N0, G7 prostate cancer s/p EBRT. PSA was noted to be rising 2018. Imaging showed inguinal LN involvement and was started on Eligard and gets it q 6 months.    Discussion of decision making  Oncology history updated, accordingly, during this visit  Goals of care/patient communication  I discussed with the patient the clinical course leading up to their cancer diagnosis. I reviewed relevant office notes, imaging reports and pathology result as well.  I told the patient that this is a case incurable disease and what this means. We discussed that the goal of anti-cancer therapy is to provide best quality of life, extend overall survival, and progression free survival as shown in clinical trials. We also discussed that there might be a point when the cancer will no longer respond to this anti-neoplastic therapy.   I explained the risks/benefits of the proposed  cancer therapy: Eligard and after discussion including understanding risks of possible life-threatening complications and therapy-related malignancy development, informed consent for blood products and treatment has been signed and obtained.  TNM/Staging At Diagnosis  cT2 N0 M0 Pampa score 7 prostate cancer  Cancer Staging   II  Disease Features/Tumor Markers/Genetics  Tumor Marker: PSA  10/31/2022: 0.4  4/27/2023:   0.7  11/6/2023:   0.87  2/1/2024:     0.96  5/3/2024:     1.13  11/4/2024: 1.256   12/7/2024: testosterone <10  Notable Path Features: G7  Treatment: Eligard  Other Supportive care:   Treatment Team Members  Surgeon  Rad Onc: Dr. Brand  Palliative  Labs  Diagnostics  10/5/2020 PET/CT Axumin: Heterogeneous radiotracer uptake at the prostate gland suspicious for disease recurrence. A small left inguinal lymph node with Axumin activity greater than blood pool raises suspicion for metastasis  9/11/2023 NM Bone scan: No scintigraphic evidence of osseous metastasis  CT Abd/pelv w/c: No evidence of metastatic disease in the abdomen or pelvis. Decreased size of a 7 x 6 mm left inguinal lymph node, previously described on prior PET/CT  12/9/2024 PET/CT PSMA: Small asymmetric focus of tracer activity involving the left prostate gland with additional mild nonspecific asymmetric heterogeneous activity involving the left prostate gland suspicious for intraprostatic PSMA positive malignancy. No focal tracer activity characteristic of extraprostatic PSMA positive metastatic disease  No metastasis    PSA doubling rate is >1 year      Discussion of decision making    I personally reviewed the following lab results, the image studies, pathology, other specialty/physicians consult notes and recommendations, and outside medical records. I had a lengthy discussion with the patient and shared the work-up findings. We discussed the diagnosis and management plan as below. I spent 42 minutes reviewing the records (labs,  clinician notes, outside records, medical history, ordering medicine/tests/procedures, interpreting the imaging/labs previously done) and coordination of care as well as direct time with the patient today, of which greater than 50% of the time was spent in counseling and coordination of care with the patient/family.    Plan/Labs  -Cont q6 months Eligard   -F/u Urology  NO role for oral ADT at this time, watching the PSA doubling time trend closely (still Doubling rate is >10 months)  -PSA ordered 2/2025     Follow Up: 12 weeks    All questions were answered to the patient's satisfaction during this encounter. The patient knows the contact information for our office and knows to reach out for any relevant concerns related to this encounter. They are to call for any temperature 100.4 or higher, new symptoms including but not restricted to shaking chills, decreased appetite, nausea, vomiting, diarrhea, increased fatigue, shortness of breath or chest pain, confusion, and not feeling the strength to come to the clinic. For all other listed problems and medical diagnosis in their chart - they are managed by PCP and/or other specialists, which the patient acknowledges. Thank you very much for your consultation and making us a part of this patient's care. We are continuing to follow closely with you. Please do not hesitate to reach out to me with any additional questions or concerns.    Francine Kemp MD  Hematology & Medical Oncology Staff Physician             Disclaimer: This document was prepared using [employer] technology. If a word or phrase is confusing, or does not make sense, this is likely due to recognition error which was not discovered during this clinician's review. If you believe an error has occurred, please contact me through [facility] service line for [email or web address]?cation.      ONCOLOGY HISTORY OF PRESENT ILLNESS        Oncology History Overview Note   77 y.o. year old male completed definitive  radiation therapy for Lebanon score 6 stage II prostate adenocarcinoma on 4/1/2009  with TomoTherapy at [facility].  PET scan 10/5/20  was consistent with recurrent disease in the prostate and a small left inguinal lymph node that is suspicious.  He is currently on Casodex and Eligard.    He was last seen in radiation oncology on 11/17/21 and returns for his annual follow-up    Lab Results   Component Value Date    PSA 0.4 04/22/2022    PSA 3.8 09/04/2020    PSA 2.3 09/03/2019 5/5/22  Rocio  Clinically stable, PSA remains suppressed.  Recommend cont. Eligard.  F/U in 6  months with PSA    11/7/22  Rocio Mitchell  CHECK NOTE       Prostate cancer (HCC)   11/25/2008 Initial Diagnosis    Prostate cancer (HCC)  - pretreatment PSA was 4.2 NG/ML.      11/25/2008 Biopsy    Prostate biopsy: adenocarcinoma, primarily Lebanon score 3+3=6 with one focus within the left lateral mid gland with Berna score 3+4=7.    - Dr Andrew Bell     11/25/2008 -  Cancer Staged    Stage II, T2 N0 M0, Lebanon 7     2/2/2009 - 4/1/2009 Radiation    TomoTherapy - site: prostate. Dose 7560 cGy at [facility]   - Dr Brand     11/13/2020 -  Hormone Therapy    30 days of Casodex, Lupron injection           SUBJECTIVE  (INTERVAL HISTORY)      Clotting History None   Bleeding History None   Cancer History Prostate   Family Cancer History None   H/O Blood/Plt Transfusion None   Tobacco/etoh Remote smoker 1/4 PPD 30 years, drinks 4 cans of beer 2-3 X x week   Cancer Screening history N/a   Occupation  (still works)     Pain: none    Has stable, low energy.  He still gets several daily hot flashes, daytime sweating on Eligard that doesn't bother him. Mild, intermittent LH.  I have reviewed the relevant past medical, surgical, social and family history. I have also reviewed allergies and medications for this patient.    Review of Systems  Baseline weight: 230 lbs    Denies F/C, N/V, SOB, CP, HA, rash, itching, gen  weakness, melena, hematuria, hematochezia, falls, diarrhea, or constipation     A 10-point review of system was performed, pertinent positive and negative were detailed as above. Otherwise, the 10-point review of system was negative.      Past Medical History:   Diagnosis Date    Cancer (HCC)     Prostate Cancer    Lyme disease     Prostate cancer (HCC) 2008       Past Surgical History:   Procedure Laterality Date    COLONOSCOPY      PROSTATE BIOPSY  2008    TONSILLECTOMY AND ADENOIDECTOMY      age 18       Family History   Problem Relation Age of Onset    Rectal cancer Father     Colon cancer Paternal Grandfather        Social History     Socioeconomic History    Marital status: /Civil Union     Spouse name: Not on file    Number of children: Not on file    Years of education: Not on file    Highest education level: Not on file   Occupational History    Not on file   Tobacco Use    Smoking status: Former     Current packs/day: 0.00     Types: Cigarettes     Quit date:      Years since quittin.9     Passive exposure: Past    Smokeless tobacco: Never   Vaping Use    Vaping status: Never Used   Substance and Sexual Activity    Alcohol use: Yes     Alcohol/week: 8.0 standard drinks of alcohol     Types: 8 Standard drinks or equivalent per week    Drug use: No    Sexual activity: Not on file   Other Topics Concern    Not on file   Social History Narrative    Not on file     Social Drivers of Health     Financial Resource Strain: Not on file   Food Insecurity: Not on file   Transportation Needs: Not on file   Physical Activity: Not on file   Stress: Not on file   Social Connections: Unknown (2024)    Received from [employer]     How often do you feel lonely or isolated from those around you? (Adult - for ages 18 years and over): Not on file   Intimate Partner Violence: Not on file   Housing Stability: Not on file       Allergies   Allergen Reactions    Erythromycin  "Base Nausea Only    Penicillin G Hives    Penicillins Hives       Current Outpatient Medications   Medication Sig Dispense Refill    ascorbic acid (VITAMIN C) 250 mg tablet Take 250 mg by mouth daily      aspirin 81 mg chewable tablet Chew 1 tablet Daily      Cholecalciferol 2000 units CAPS Take 1 capsule by mouth daily      DAILY MULTIPLE VITAMINS PO Take 1 tablet by mouth daily      fluticasone (FLOVENT HFA) 110 MCG/ACT inhaler Inhale 2 puffs 2 (two) times a day Rinse mouth after use.      glucosamine-chondroitin 500-400 MG tablet Take 1 tablet by mouth daily      lisinopril (ZESTRIL) 20 mg tablet Take 20 mg by mouth daily Takes 1/2 tab      Methylprednisolone 4 MG TBPK Use as directed on package 21 tablet 0    OXYCODONE HCL PO Take by mouth Prn after knee surgery, unsure of dose      rosuvastatin (CRESTOR) 20 MG tablet Take 1 tablet by mouth daily      simvastatin (ZOCOR) 20 mg tablet       zinc gluconate 50 mg tablet Take 50 mg by mouth daily       No current facility-administered medications for this visit.       (Not in a hospital admission)      Objective:     24 Hour Vitals Assessment:     Vitals:    12/11/24 1228   BP: 118/80   Pulse: 64   Resp: 18   Temp: 97.8 °F (36.6 °C)   SpO2: 98%               PHYSICIAN EXAM:    General: Appearance: alert, cooperative, no distress.  HEENT: Normocephalic, atraumatic. No scleral icterus. conjunctivae clear. EOMI.  Chest: No tenderness to palpation. No open wound noted.  Lungs: Clear to auscultation bilaterally, Respirations unlabored.  Cardiac: Regular rate (borderline) , +S1and S2  Abdomen: Soft, non-tender, non-distended. Bowel sounds are normal.  Extremities:  No edema, cyanosis, clubbing.  Skin: Skin color, turgor are normal. No rashes.  Lymphatics: no palpable supra-cervical, axillary, or inguinal adenopathy  Neurologic: Awake, Alert, and oriented, no gross focal deficits noted b/l.       DATA REVIEW:    Pathology Result:    No results found for: \"FINALDX\" "     Image Results:   Image result are reviewed and documented in Hematology/Oncology history    NM PET CT skull base to mid thigh  Narrative: PYLARIFY PSMA PET/CT SCAN    INDICATION:  C61: Malignant neoplasm of prostate  R97.21: Rising PSA following treatment for malignant neoplasm of prostate   , restaging    The following clinical information was obtained directly from EPIC: PSA is rising (11/4/24) = 1.256, (5/3/24) = 1.13. (2/1/24) = 0.96    MODIFIER: PS    COMPARISON: Axumin PET/CT dated 10/5/2020, CT of abdomen and pelvis dated 9/11/2023, CT of chest dated 2/25/2023, and bone scan dated 9/11/2023    CELL TYPE:  adenocarcinoma, Berna 6-7 (bx 11/25/08 prostate +)    TECHNIQUE:   9.4 mCi Pylarify PSMA administered IV. Multiplanar attenuation corrected and non attenuation corrected PET images were acquired 60 minutes post injection. Contiguous, low dose, axial CT sections were obtained from the vertex through the   femurs .   Intravenous or oral contrast was not utilized.  This examination, like all CT scans performed in the [facility], was performed utilizing techniques to minimize radiation dose exposure, including the use of iterative   reconstruction and automated exposure control.    FINDINGS:    VISUALIZED BRAIN:  No acute abnormalities are seen.    HEAD/NECK:  There is a physiologic distribution of the radiotracer. No PSMA avid cervical adenopathy is seen.  CT images: Intracranial atherosclerotic calcification is noted.    CHEST:  No PSMA avid soft tissue lesions are seen.  CT images: Trace gynecomastia. Atherosclerotic vascular calcifications including those of the coronary arteries are noted. Small hiatal hernia. Mild nonspecific subpleural interstitial thickening which was better characterized on prior dedicated CT of   chest    ABDOMEN:  No focal tracer activity characteristic of PSMA positive metastatic disease.  CT images: Stable small hepatic hypodensity on image 136 of series 3  which was better characterized on prior contrast-enhanced CT. Stable hyperdense right upper pole nodule on image 159 of series 3 which is incompletely characterized on current low-dose   unenhanced CT. Small left renal hypodensities were better characterized on prior contrast-enhanced CT. Atherosclerotic vascular calcifications are noted.    PELVIS:  Best seen on PET image 261 is asymmetric subcentimeter focus of tracer activity involving the peripheral left posterolateral prostate gland with max SUV of 4.4 suspicious for intraprostatic PSMA positive malignancy with differential diagnosis including   inflammatory activity.    Additional mild nonspecific asymmetric heterogeneous activity is noted involving the left prostate gland which could reflect additional intraprostatic PSMA positive malignancy with differential diagnosis including inflammatory activity.  CT images: Bilateral fat-containing inguinal hernias.    OSSEOUS STRUCTURES:  No PSMA avid lesions are seen.  CT images: Degenerative changes are noted involving the spine.  Impression: 1. Small asymmetric focus of tracer activity involving the left prostate gland with additional mild nonspecific asymmetric heterogeneous activity involving the left prostate gland suspicious for intraprostatic PSMA positive malignancy.    2. No focal tracer activity characteristic of extraprostatic PSMA positive metastatic disease.    Please see above for details and additional findings.    Workstation performed: [id number]      LABS:  Lab data are reviewed and documented in [facility] history.       Lab Results   Component Value Date    HGB 13.1 12/06/2024    HCT 42.0 12/06/2024    MCV 89 12/06/2024     12/06/2024    WBC 5.45 12/06/2024    NRBC 0 12/06/2024     Lab Results   Component Value Date    K 4.5 12/06/2024     12/06/2024    CO2 26 12/06/2024    BUN 22 12/06/2024    CREATININE 0.77 12/06/2024    GLUF 112 (H) 12/06/2024    CALCIUM 8.9 12/06/2024    AST 18  "12/06/2024    ALT 14 12/06/2024    ALKPHOS 96 12/06/2024    EGFR 86 12/06/2024       No results found for: \"IRON\", \"TIBC\", \"FERRITIN\"    No results found for: \"YATWWDCC03\"    No results for input(s): \"WBC\", \"CREAT\", \"PLT\" in the last 72 hours.    By:  Francine Kemp MD, 12/11/2024, 12:39 PM                                  "

## 2024-12-05 ENCOUNTER — APPOINTMENT (OUTPATIENT)
Dept: LAB | Facility: HOSPITAL | Age: 79
End: 2024-12-05
Payer: COMMERCIAL

## 2024-12-05 DIAGNOSIS — C61 PROSTATE CANCER (HCC): ICD-10-CM

## 2024-12-05 PROCEDURE — 85025 COMPLETE CBC W/AUTO DIFF WBC: CPT

## 2024-12-05 PROCEDURE — 80053 COMPREHEN METABOLIC PANEL: CPT

## 2024-12-05 PROCEDURE — 84403 ASSAY OF TOTAL TESTOSTERONE: CPT

## 2024-12-06 ENCOUNTER — APPOINTMENT (OUTPATIENT)
Dept: LAB | Facility: HOSPITAL | Age: 79
End: 2024-12-06
Payer: COMMERCIAL

## 2024-12-06 LAB
ALBUMIN SERPL BCG-MCNC: 4 G/DL (ref 3.5–5)
ALP SERPL-CCNC: 96 U/L (ref 34–104)
ALT SERPL W P-5'-P-CCNC: 14 U/L (ref 7–52)
ANION GAP SERPL CALCULATED.3IONS-SCNC: 6 MMOL/L (ref 4–13)
AST SERPL W P-5'-P-CCNC: 18 U/L (ref 13–39)
BASOPHILS # BLD AUTO: 0.04 THOUSANDS/ÂΜL (ref 0–0.1)
BASOPHILS NFR BLD AUTO: 1 % (ref 0–1)
BILIRUB SERPL-MCNC: 0.53 MG/DL (ref 0.2–1)
BUN SERPL-MCNC: 22 MG/DL (ref 5–25)
CALCIUM SERPL-MCNC: 8.9 MG/DL (ref 8.4–10.2)
CHLORIDE SERPL-SCNC: 108 MMOL/L (ref 96–108)
CO2 SERPL-SCNC: 26 MMOL/L (ref 21–32)
CREAT SERPL-MCNC: 0.77 MG/DL (ref 0.6–1.3)
EOSINOPHIL # BLD AUTO: 0.21 THOUSAND/ÂΜL (ref 0–0.61)
EOSINOPHIL NFR BLD AUTO: 4 % (ref 0–6)
ERYTHROCYTE [DISTWIDTH] IN BLOOD BY AUTOMATED COUNT: 14.5 % (ref 11.6–15.1)
GFR SERPL CREATININE-BSD FRML MDRD: 86 ML/MIN/1.73SQ M
GLUCOSE P FAST SERPL-MCNC: 112 MG/DL (ref 65–99)
HCT VFR BLD AUTO: 42 % (ref 36.5–49.3)
HGB BLD-MCNC: 13.1 G/DL (ref 12–17)
IMM GRANULOCYTES # BLD AUTO: 0.02 THOUSAND/UL (ref 0–0.2)
IMM GRANULOCYTES NFR BLD AUTO: 0 % (ref 0–2)
LYMPHOCYTES # BLD AUTO: 1.6 THOUSANDS/ÂΜL (ref 0.6–4.47)
LYMPHOCYTES NFR BLD AUTO: 29 % (ref 14–44)
MCH RBC QN AUTO: 27.9 PG (ref 26.8–34.3)
MCHC RBC AUTO-ENTMCNC: 31.2 G/DL (ref 31.4–37.4)
MCV RBC AUTO: 89 FL (ref 82–98)
MONOCYTES # BLD AUTO: 0.32 THOUSAND/ÂΜL (ref 0.17–1.22)
MONOCYTES NFR BLD AUTO: 6 % (ref 4–12)
NEUTROPHILS # BLD AUTO: 3.26 THOUSANDS/ÂΜL (ref 1.85–7.62)
NEUTS SEG NFR BLD AUTO: 60 % (ref 43–75)
NRBC BLD AUTO-RTO: 0 /100 WBCS
PLATELET # BLD AUTO: 247 THOUSANDS/UL (ref 149–390)
PMV BLD AUTO: 9.7 FL (ref 8.9–12.7)
POTASSIUM SERPL-SCNC: 4.5 MMOL/L (ref 3.5–5.3)
PROT SERPL-MCNC: 7 G/DL (ref 6.4–8.4)
RBC # BLD AUTO: 4.7 MILLION/UL (ref 3.88–5.62)
SODIUM SERPL-SCNC: 140 MMOL/L (ref 135–147)
TESTOST SERPL-MSCNC: <10 NG/DL (ref 175–781)
WBC # BLD AUTO: 5.45 THOUSAND/UL (ref 4.31–10.16)

## 2024-12-06 PROCEDURE — 36415 COLL VENOUS BLD VENIPUNCTURE: CPT

## 2024-12-09 ENCOUNTER — HOSPITAL ENCOUNTER (OUTPATIENT)
Dept: RADIOLOGY | Age: 79
Discharge: HOME | End: 2024-12-09
Payer: COMMERCIAL

## 2024-12-09 DIAGNOSIS — C61 PROSTATE CANCER (HCC): ICD-10-CM

## 2024-12-09 DIAGNOSIS — R97.21 RISING PSA FOLLOWING TREATMENT FOR MALIGNANT NEOPLASM OF PROSTATE: ICD-10-CM

## 2024-12-09 PROCEDURE — 78815 PET IMAGE W/CT SKULL-THIGH: CPT

## 2024-12-09 PROCEDURE — A9595 HB PIFLUFOLASTAT F-18, DIAGNOSTIC, 1 MILLICURIE: HCPCS

## 2024-12-11 ENCOUNTER — OFFICE VISIT (OUTPATIENT)
Dept: HEMATOLOGY ONCOLOGY | Facility: CLINIC | Age: 79
End: 2024-12-11
Payer: COMMERCIAL

## 2024-12-11 VITALS
HEART RATE: 64 BPM | HEIGHT: 69 IN | BODY MASS INDEX: 35.99 KG/M2 | DIASTOLIC BLOOD PRESSURE: 80 MMHG | WEIGHT: 243 LBS | SYSTOLIC BLOOD PRESSURE: 118 MMHG | OXYGEN SATURATION: 98 % | TEMPERATURE: 97.8 F | RESPIRATION RATE: 18 BRPM

## 2024-12-11 DIAGNOSIS — C61 PROSTATE CANCER (HCC): Primary | ICD-10-CM

## 2024-12-11 PROCEDURE — 99214 OFFICE O/P EST MOD 30 MIN: CPT | Performed by: INTERNAL MEDICINE

## 2024-12-11 RX ORDER — SIMVASTATIN 20 MG
TABLET ORAL
COMMUNITY
Start: 2024-11-25

## 2025-01-14 ENCOUNTER — TELEPHONE (OUTPATIENT)
Dept: HEMATOLOGY ONCOLOGY | Facility: CLINIC | Age: 80
End: 2025-01-14

## 2025-01-14 NOTE — TELEPHONE ENCOUNTER
Call received by Evie. Per Evie patient received a letter from insurance stating  does not accept [employer] P[employer]. I checked insurance par-list and we do accept that insurance.     Patient wants to make sure insurance is accepted for follow up appointment scheduled.         Thanks!

## 2025-04-22 ENCOUNTER — TELEPHONE (OUTPATIENT)
Dept: HEMATOLOGY ONCOLOGY | Facility: CLINIC | Age: 80
End: 2025-04-22

## 2025-04-22 NOTE — TELEPHONE ENCOUNTER
Advised patient that due to a scheduling change, Dr. Kemp is working remotely on Friday 5/2/2025.     All appointments will be converted to virtual visits via [employer] for required video capability.      If patient prefers to be seen in person he may be rescheduled to another date/time.     [employer] number provided for call back or if patient has any connectivity issues, [phone].

## 2025-04-25 ENCOUNTER — APPOINTMENT (OUTPATIENT)
Dept: LAB | Facility: HOSPITAL | Age: 80
End: 2025-04-25
Payer: COMMERCIAL

## 2025-04-25 DIAGNOSIS — C61 PROSTATE CANCER (HCC): ICD-10-CM

## 2025-04-25 LAB — PSA SERPL-MCNC: 1.41 NG/ML (ref 0–4)

## 2025-04-25 PROCEDURE — 36415 COLL VENOUS BLD VENIPUNCTURE: CPT

## 2025-04-25 PROCEDURE — 84153 ASSAY OF PSA TOTAL: CPT

## 2025-05-02 ENCOUNTER — TELEPHONE (OUTPATIENT)
Age: 80
End: 2025-05-02

## 2025-05-02 NOTE — TELEPHONE ENCOUNTER
Pt would like Dr. Kemp to review pt last lab results and to receive a call back to discuss. She can be reached at both mobile and home numbers.

## 2025-05-02 NOTE — TELEPHONE ENCOUNTER
"Patient's spouse returned call to [facility]AN[facility] Sherman and I relayed message from Whit to her understanding.      \"Call out to patient and spouse with DR. Kemp's recommendations to have repeat PSA repeated prior to new appointment in June. Order placed. Dr. Kemp reviewed PSA did increase slightly and would recommend patient to have Guardant 360 completed.     Please review with patient/spouse if they call back.  RN left  with call back number.     Please review Dr. Kemp is out of office the next 2 weeks and a Guardant kit would  be available for patient  mid next week at [place] office\"    They will come to the office on Thursday 5/8 to  the Guardant testing kit.  "

## 2025-05-02 NOTE — TELEPHONE ENCOUNTER
Call out to patient and spouse with DR. Kemp's recommendations to have repeat PSA repeated prior to new appointment in June. Order placed. Dr. Kemp reviewed PSA did increase slightly and would recommend patient to have Guardant 360 completed.    Please review with patient/spouse if they call back.  RN left  with call back number.    Please review Dr. Kemp is out of office the next 2 weeks and a Guardant kit would  be available for patient  mid next week at [place] office.

## 2025-05-05 DIAGNOSIS — R97.21 RISING PSA FOLLOWING TREATMENT FOR MALIGNANT NEOPLASM OF PROSTATE: ICD-10-CM

## 2025-05-05 DIAGNOSIS — C61 PROSTATE CANCER (HCC): Primary | ICD-10-CM

## 2025-05-13 ENCOUNTER — HOSPITAL ENCOUNTER (OUTPATIENT)
Dept: INFUSION CENTER | Facility: CLINIC | Age: 80
Discharge: HOME | End: 2025-05-13
Attending: INTERNAL MEDICINE
Payer: COMMERCIAL

## 2025-05-13 ENCOUNTER — APPOINTMENT (OUTPATIENT)
Dept: LAB | Facility: HOSPITAL | Age: 80
End: 2025-05-13
Payer: COMMERCIAL

## 2025-05-13 DIAGNOSIS — R97.21 RISING PSA FOLLOWING TREATMENT FOR MALIGNANT NEOPLASM OF PROSTATE: Primary | ICD-10-CM

## 2025-05-13 DIAGNOSIS — C61 PROSTATE CANCER (HCC): ICD-10-CM

## 2025-05-13 DIAGNOSIS — R97.21 RISING PSA FOLLOWING TREATMENT FOR MALIGNANT NEOPLASM OF PROSTATE: ICD-10-CM

## 2025-05-13 PROCEDURE — 36415 COLL VENOUS BLD VENIPUNCTURE: CPT

## 2025-05-13 PROCEDURE — 96402 CHEMO HORMON ANTINEOPL SQ/IM: CPT

## 2025-05-13 RX ADMIN — LEUPROLIDE ACETATE 45 MG: 45 INJECTION, SUSPENSION, EXTENDED RELEASE SUBCUTANEOUS at 10:01

## 2025-05-13 NOTE — PROGRESS NOTES
Patient stopped by at the clinic post eligard injection.    Patient received Guardant kit. Reviewed purpose.

## 2025-05-19 ENCOUNTER — TELEPHONE (OUTPATIENT)
Dept: HEMATOLOGY ONCOLOGY | Facility: CLINIC | Age: 80
End: 2025-05-19

## 2025-05-19 NOTE — TELEPHONE ENCOUNTER
Guardant results obtained from portal.  Uploaded for provider review.    Appointment with Dr. Kemp: 6/26

## 2025-06-17 NOTE — ASSESSMENT & PLAN NOTE
This is a 79 y.o. c PMHx notable for Lyme disease, being seen in consultation for recurrent metastatic prostate cancer  Orders:    PSA Total, Diagnostic; Future

## 2025-06-17 NOTE — PROGRESS NOTES
Name: Magno Barclay      : 1945      MRN: [id number]  Encounter Provider: Francine Kemp MD  Encounter Date: 2025   Encounter department: [facility]  :  Assessment & Plan  Prostate cancer (HCC)    This is a 79 y.o. c PMHx notable for Lyme disease, being seen in consultation for recurrent metastatic prostate cancer  Orders:    PSA Total, Diagnostic; Future      Assessment & Plan  1. Prostate cancer.  His PSA levels, as of 2025, indicate a slight increase but not a significant one within a year. This suggests that the cancer is under 90 to 95 percent control with the current treatment regimen. The absence of any detectable mutations in the recent blood work is a positive sign, indicating a low burden of disease. He will continue with the current injection therapy. A PSA test will be scheduled for the end of 2025 to monitor his condition.    Follow-up  The patient will follow up in 7 months.      No follow-ups on file.    History of Present Illness   Chief Complaint   Patient presents with    Follow-up     History of Present Illness  The patient presents for prostate cancer.    He reports a decrease in energy levels, which he attributes to the natural aging process. Despite this, he maintains his ability to perform daily activities without significant difficulty. He does not experience any systemic symptoms such as fevers, shaking chills, nausea, vomiting, lightheadedness, or headaches. There is no presence of hematuria or hematochezia. He also reports experiencing hot flashes and sweating several times a day, but these symptoms are mild and transient. His blood pressure readings have been consistently lower than the current reading of 128/80. He is on Eligard injection.    MEDICATIONS  Eligard.      Objective   /80 (BP Location: Left arm, Patient Position: Sitting, Cuff Size: Adult)   Pulse 66   Temp (!) 96.8 °F (36 °C) (Temporal)   " Resp 16   Ht 5' 9\" (1.753 m)   Wt 109 kg (240 lb)   SpO2 96%   BMI 35.44 kg/m²       Cancer Stage at diagnosis: II, interval metastatic recurrence    Referral Physician: No ref. provider found    Primary Care Physician:  No primary care provider on file.     Nickname: Yung    Spouse: Rafael Osorio ECO    Today's ECO    Goals and Barriers:  Current Goal: Minimize effects of disease burden, extend life.   Barriers to accomplishing this: None    Patient's Capacity to Self Care:  Patient is able to self care    Code Status: not addressed today    Advanced directives: not addressed today      This is a 79 y.o. c PMHx notable for Lyme disease, being seen in consultation for recurrent metastatic prostate cancer    Pt was diagnosed 3/2009 with cT2N0, G7 prostate cancer s/p EBRT. PSA was noted to be rising 2018. Imaging showed inguinal LN involvement and was started on Eligard and gets it q 6 months.    Discussion of decision making  Oncology history updated, accordingly, during this visit  Goals of care/patient communication  I discussed with the patient the clinical course leading up to their cancer diagnosis. I reviewed relevant office notes, imaging reports and pathology result as well.  I told the patient that this is a case incurable disease and what this means. We discussed that the goal of anti-cancer therapy is to provide best quality of life, extend overall survival, and progression free survival as shown in clinical trials. We also discussed that there might be a point when the cancer will no longer respond to this anti-neoplastic therapy.   I explained the risks/benefits of the proposed cancer therapy: Eligard and after discussion including understanding risks of possible life-threatening complications and therapy-related malignancy development, informed consent for blood products and treatment has been signed and obtained.  TNM/Staging At Diagnosis  cT2 N0 M0 Berna score 7 prostate " cancer  Cancer Staging   II  Disease Features/Tumor Markers/Genetics  Tumor Marker: PSA  10/31/2022: 0.4  4/27/2023:   0.7  11/6/2023:   0.87  2/1/2024:     0.96  5/3/2024:     1.13  11/4/2024: 1.256   4/25/2025: 1.412  12/7/2024: testosterone <10  Notable Path Features: G7  5/13/2025: [facility] NGS no actionable biomarkers  Treatment: Eligard  Other Supportive care:   Treatment Team Members  Surgeon  Rad Onc: Dr. Brand  Palliative  Labs  Diagnostics  10/5/2020 PET/CT Axumin: Heterogeneous radiotracer uptake at the prostate gland suspicious for disease recurrence. A small left inguinal lymph node with Axumin activity greater than blood pool raises suspicion for metastasis  9/11/2023 NM Bone scan: No scintigraphic evidence of osseous metastasis  CT Abd/pelv w/c: No evidence of metastatic disease in the abdomen or pelvis. Decreased size of a 7 x 6 mm left inguinal lymph node, previously described on prior PET/CT  12/9/2024 PET/CT PSMA: Small asymmetric focus of tracer activity involving the left prostate gland with additional mild nonspecific asymmetric heterogeneous activity involving the left prostate gland suspicious for intraprostatic PSMA positive malignancy. No focal tracer activity characteristic of extraprostatic PSMA positive metastatic disease  No metastasis    PSA doubling rate is >1 year      Discussion of decision making    I personally reviewed the following lab results, the image studies, pathology, other specialty/physicians consult notes and recommendations, and outside medical records. I had a lengthy discussion with the patient and shared the work-up findings. We discussed the diagnosis and management plan as below. I spent 31 minutes reviewing the records (labs, clinician notes, outside records, medical history, ordering medicine/tests/procedures, interpreting the imaging/labs previously done) and coordination of care as well as direct time with the patient today, of which greater than 50% of the  time was spent in counseling and coordination of care with the patient/family.    Plan/Labs  -Cont q6 months Eligard   -F/u Urology  NO role for oral ADT at this time, watching the PSA doubling time trend closely (still Doubling rate is >10 months)  -PSA ordered in 12 weeks    Follow Up: 12 weeks    All questions were answered to the patient's satisfaction during this encounter. The patient knows the contact information for our office and knows to reach out for any relevant concerns related to this encounter. They are to call for any temperature 100.4 or higher, new symptoms including but not restricted to shaking chills, decreased appetite, nausea, vomiting, diarrhea, increased fatigue, shortness of breath or chest pain, confusion, and not feeling the strength to come to the clinic. For all other listed problems and medical diagnosis in their chart - they are managed by PCP and/or other specialists, which the patient acknowledges. Thank you very much for your consultation and making us a part of this patient's care. We are continuing to follow closely with you. Please do not hesitate to reach out to me with any additional questions or concerns.    Francine Kemp MD  Hematology & Medical Oncology Staff Physician             Disclaimer: This document was prepared using [employer] technology. If a word or phrase is confusing, or does not make sense, this is likely due to recognition error which was not discovered during this clinician's review. If you believe an error has occurred, please contact me through [employer] service line for [email or web address]?cation.      ONCOLOGY HISTORY OF PRESENT ILLNESS        Oncology History Overview Note   77 y.o. year old male completed definitive radiation therapy for Berna score 6 stage II prostate adenocarcinoma on 4/1/2009  with TomoTherapy at [facility].  PET scan 10/5/20  was consistent with recurrent disease in the prostate and a small left inguinal lymph node that is  suspicious.  He is currently on Casodex and Eligard.    He was last seen in radiation oncology on 11/17/21 and returns for his annual follow-up    Lab Results   Component Value Date    PSA 0.4 04/22/2022    PSA 3.8 09/04/2020    PSA 2.3 09/03/2019 5/5/22  Rocio  Clinically stable, PSA remains suppressed.  Recommend cont. Eligard.  F/U in 6  months with PSA    11/7/22  Rocio Mitchell  CHECK NOTE       Prostate cancer (HCC)   11/25/2008 Initial Diagnosis    Prostate cancer (HCC)  - pretreatment PSA was 4.2 NG/ML.      11/25/2008 Biopsy    Prostate biopsy: adenocarcinoma, primarily [place] score 3+3=6 with one focus within the left lateral mid gland with [place] score 3+4=7.    - Dr Andrew Bell     11/25/2008 -  Cancer Staged    Stage II, T2 N0 M0, Berna 7     2/2/2009 - 4/1/2009 Radiation    TomoTherapy - site: prostate. Dose 7560 cGy at [facility]   - Dr Brand     11/13/2020 -  Hormone Therapy    30 days of Casodex, Lupron injection     4/23/2025 -  Cancer Staged    Staging form: Prostate, AJCC 8th Edition  - Clinical: cM0 - Signed by Francine Kemp MD on 4/23/2025             SUBJECTIVE  (INTERVAL HISTORY)      Clotting History None   Bleeding History None   Cancer History Prostate   Family Cancer History None   H/O Blood/Plt Transfusion None   Tobacco/etoh Remote smoker 1/4 PPD 30 years, drinks 4 cans of beer 2-3 X x week   Cancer Screening history N/a   Occupation  (still works)     Pain: none    He still gets several daily hot flashes, daytime sweating on Eligard that doesn't bother him. Mild, intermittent LH.  See above for updates.    I have reviewed the relevant past medical, surgical, social and family history. I have also reviewed allergies and medications for this patient.    Review of Systems  Baseline weight: 230 lbs    Denies F/C, N/V, SOB, CP, HA, rash, itching, gen weakness, melena, hematuria, hematochezia, falls, diarrhea, or constipation     A 10-point  review of system was performed, pertinent positive and negative were detailed as above. Otherwise, the 10-point review of system was negative.      Past Medical History:   Diagnosis Date    Cancer (HCC)     Prostate Cancer    Lyme disease     Prostate cancer (HCC) 2008       Past Surgical History:   Procedure Laterality Date    COLONOSCOPY      PROSTATE BIOPSY  2008    TONSILLECTOMY AND ADENOIDECTOMY      age 18       Family History   Problem Relation Name Age of Onset    Rectal cancer Father      Colon cancer Paternal Grandfather         Social History     Socioeconomic History    Marital status: /Civil Union     Spouse name: Not on file    Number of children: Not on file    Years of education: Not on file    Highest education level: Not on file   Occupational History    Not on file   Tobacco Use    Smoking status: Former     Current packs/day: 0.00     Types: Cigarettes     Quit date:      Years since quittin.5     Passive exposure: Past    Smokeless tobacco: Never   Vaping Use    Vaping status: Never Used   Substance and Sexual Activity    Alcohol use: Yes     Alcohol/week: 8.0 standard drinks of alcohol     Types: 8 Standard drinks or equivalent per week    Drug use: No    Sexual activity: Not on file   Other Topics Concern    Not on file   Social History Narrative    Not on file     Social Drivers of Health     Financial Resource Strain: Not on file   Food Insecurity: Not on file   Transportation Needs: Not on file   Physical Activity: Not on file   Stress: Not on file   Social Connections: Unknown (2024)    Received from [facility]     How often do you feel lonely or isolated from those around you? (Adult - for ages 18 years and over): Not on file   Intimate Partner Violence: Not on file   Housing Stability: Not on file       Allergies   Allergen Reactions    Erythromycin Base Nausea Only    Penicillin G Hives    Penicillins Hives       Current Outpatient  Medications   Medication Sig Dispense Refill    ascorbic acid (VITAMIN C) 250 mg tablet Take 250 mg by mouth in the morning.      aspirin 81 mg chewable tablet Chew 1 tablet in the morning.      Cholecalciferol 2000 units CAPS Take 1 capsule by mouth in the morning.      DAILY MULTIPLE VITAMINS PO Take 1 tablet by mouth in the morning.      fluticasone (FLOVENT HFA) 110 MCG/ACT inhaler Inhale 2 puffs in the morning and 2 puffs in the evening. Rinse mouth after use.      glucosamine-chondroitin 500-400 MG tablet Take 1 tablet by mouth in the morning.      lisinopril (ZESTRIL) 20 mg tablet Take 20 mg by mouth in the morning. Takes 1/2 tab .      Methylprednisolone 4 MG TBPK Use as directed on package 21 tablet 0    OXYCODONE HCL PO Take by mouth Prn after knee surgery, unsure of dose      rosuvastatin (CRESTOR) 20 MG tablet Take 1 tablet by mouth in the morning.      simvastatin (ZOCOR) 20 mg tablet       zinc gluconate 50 mg tablet Take 50 mg by mouth in the morning.       No current facility-administered medications for this visit.       (Not in a hospital admission)      Objective:     24 Hour Vitals Assessment:     Vitals:    06/26/25 0950   BP: 128/80   Pulse: 66   Resp: 16   Temp: (!) 96.8 °F (36 °C)   SpO2: 96%       PHYSICIAN EXAM:    General: Appearance: alert, cooperative, no distress.  HEENT: Normocephalic, atraumatic. No scleral icterus. conjunctivae clear. EOMI.  Chest: No tenderness to palpation. No open wound noted.  Lungs: Clear to auscultation bilaterally, Respirations unlabored.  Cardiac: Regular rate (borderline) , +S1and S2  Abdomen: Soft, non-tender, non-distended. Bowel sounds are normal.  Extremities:  No edema, cyanosis, clubbing.  Skin: Skin color, turgor are normal. No rashes.  Lymphatics: no palpable supra-cervical, axillary, or inguinal adenopathy  Neurologic: Awake, Alert, and oriented, no gross focal deficits noted b/l.       DATA REVIEW:    Pathology Result:    No results found for:  "\"FINALDX\"     Image Results:   Image result are reviewed and documented in Hematology/Oncology history    NM PET CT skull base to mid thigh  Narrative: PYLARIFY PSMA PET/CT SCAN    INDICATION:  C61: Malignant neoplasm of prostate  R97.21: Rising PSA following treatment for malignant neoplasm of prostate   , restaging    The following clinical information was obtained directly from EPIC: PSA is rising (11/4/24) = 1.256, (5/3/24) = 1.13. (2/1/24) = 0.96    MODIFIER: PS    COMPARISON: Axumin PET/CT dated 10/5/2020, CT of abdomen and pelvis dated 9/11/2023, CT of chest dated 2/25/2023, and bone scan dated 9/11/2023    CELL TYPE:  adenocarcinoma, [place] 6-7 (bx 11/25/08 prostate +)    TECHNIQUE:   9.4 mCi Pylarify PSMA administered IV. Multiplanar attenuation corrected and non attenuation corrected PET images were acquired 60 minutes post injection. Contiguous, low dose, axial CT sections were obtained from the vertex through the   femurs .   Intravenous or oral contrast was not utilized.  This examination, like all CT scans performed in the [facility], was performed utilizing techniques to minimize radiation dose exposure, including the use of iterative   reconstruction and automated exposure control.    FINDINGS:    VISUALIZED BRAIN:  No acute abnormalities are seen.    HEAD/NECK:  There is a physiologic distribution of the radiotracer. No PSMA avid cervical adenopathy is seen.  CT images: Intracranial atherosclerotic calcification is noted.    CHEST:  No PSMA avid soft tissue lesions are seen.  CT images: Trace gynecomastia. Atherosclerotic vascular calcifications including those of the coronary arteries are noted. Small hiatal hernia. Mild nonspecific subpleural interstitial thickening which was better characterized on prior dedicated CT of   chest    ABDOMEN:  No focal tracer activity characteristic of PSMA positive metastatic disease.  CT images: Stable small hepatic hypodensity on image 136 of " series 3 which was better characterized on prior contrast-enhanced CT. Stable hyperdense right upper pole nodule on image 159 of series 3 which is incompletely characterized on current low-dose   unenhanced CT. Small left renal hypodensities were better characterized on prior contrast-enhanced CT. Atherosclerotic vascular calcifications are noted.    PELVIS:  Best seen on PET image 261 is asymmetric subcentimeter focus of tracer activity involving the peripheral left posterolateral prostate gland with max SUV of 4.4 suspicious for intraprostatic PSMA positive malignancy with differential diagnosis including   inflammatory activity.    Additional mild nonspecific asymmetric heterogeneous activity is noted involving the left prostate gland which could reflect additional intraprostatic PSMA positive malignancy with differential diagnosis including inflammatory activity.  CT images: Bilateral fat-containing inguinal hernias.    OSSEOUS STRUCTURES:  No PSMA avid lesions are seen.  CT images: Degenerative changes are noted involving the spine.  Impression: 1. Small asymmetric focus of tracer activity involving the left prostate gland with additional mild nonspecific asymmetric heterogeneous activity involving the left prostate gland suspicious for intraprostatic PSMA positive malignancy.    2. No focal tracer activity characteristic of extraprostatic PSMA positive metastatic disease.    Please see above for details and additional findings.    Workstation performed: [id number]      LABS:  Lab data are reviewed and documented in [facility] history.       Lab Results   Component Value Date    HGB 13.1 12/06/2024    HCT 42.0 12/06/2024    MCV 89 12/06/2024     12/06/2024    WBC 5.45 12/06/2024    NRBC 0 12/06/2024     Lab Results   Component Value Date    K 4.5 12/06/2024     12/06/2024    CO2 26 12/06/2024    BUN 22 12/06/2024    CREATININE 0.77 12/06/2024    GLUF 112 (H) 12/06/2024    CALCIUM 8.9 12/06/2024    AST  "18 12/06/2024    ALT 14 12/06/2024    ALKPHOS 96 12/06/2024    EGFR 86 12/06/2024       No results found for: \"IRON\", \"TIBC\", \"FERRITIN\"    No results found for: \"HUGXGUKG08\"    No results for input(s): \"WBC\", \"CREAT\", \"PLT\" in the last 72 hours.    By:  Francine Kemp MD, 6/26/2025, 9:57 AM                                  "

## 2025-06-26 ENCOUNTER — OFFICE VISIT (OUTPATIENT)
Dept: HEMATOLOGY ONCOLOGY | Facility: CLINIC | Age: 80
End: 2025-06-26
Payer: COMMERCIAL

## 2025-06-26 VITALS
SYSTOLIC BLOOD PRESSURE: 128 MMHG | OXYGEN SATURATION: 96 % | DIASTOLIC BLOOD PRESSURE: 80 MMHG | HEART RATE: 66 BPM | RESPIRATION RATE: 16 BRPM | TEMPERATURE: 96.8 F | WEIGHT: 240 LBS | BODY MASS INDEX: 35.55 KG/M2 | HEIGHT: 69 IN

## 2025-06-26 DIAGNOSIS — C61 PROSTATE CANCER (HCC): Primary | ICD-10-CM

## 2025-06-26 PROCEDURE — 99214 OFFICE O/P EST MOD 30 MIN: CPT | Performed by: INTERNAL MEDICINE

## 2025-06-26 PROCEDURE — G2211 COMPLEX E/M VISIT ADD ON: HCPCS | Performed by: INTERNAL MEDICINE
